# Patient Record
Sex: FEMALE | Race: WHITE | NOT HISPANIC OR LATINO | Employment: FULL TIME | URBAN - METROPOLITAN AREA
[De-identification: names, ages, dates, MRNs, and addresses within clinical notes are randomized per-mention and may not be internally consistent; named-entity substitution may affect disease eponyms.]

---

## 2021-02-24 ENCOUNTER — IMMUNIZATION (OUTPATIENT)
Dept: VACCINE CLINIC | Facility: HOSPITAL | Age: 41
End: 2021-02-24

## 2021-02-24 PROCEDURE — 91300 HC SARSCOV02 VAC 30MCG/0.3ML IM: CPT | Performed by: INTERNAL MEDICINE

## 2021-02-24 PROCEDURE — 0001A: CPT | Performed by: INTERNAL MEDICINE

## 2021-03-17 ENCOUNTER — IMMUNIZATION (OUTPATIENT)
Dept: VACCINE CLINIC | Facility: HOSPITAL | Age: 41
End: 2021-03-17

## 2021-03-17 PROCEDURE — 91300 HC SARSCOV02 VAC 30MCG/0.3ML IM: CPT | Performed by: INTERNAL MEDICINE

## 2021-03-17 PROCEDURE — 0002A: CPT | Performed by: INTERNAL MEDICINE

## 2021-11-12 ENCOUNTER — TRANSCRIBE ORDERS (OUTPATIENT)
Dept: ADMINISTRATIVE | Facility: HOSPITAL | Age: 41
End: 2021-11-12

## 2021-11-12 DIAGNOSIS — Z12.31 VISIT FOR SCREENING MAMMOGRAM: Primary | ICD-10-CM

## 2021-12-29 ENCOUNTER — HOSPITAL ENCOUNTER (OUTPATIENT)
Dept: MAMMOGRAPHY | Facility: HOSPITAL | Age: 41
Discharge: HOME | End: 2021-12-29
Admitting: NURSE PRACTITIONER

## 2021-12-29 DIAGNOSIS — Z12.31 VISIT FOR SCREENING MAMMOGRAM: ICD-10-CM

## 2021-12-29 PROCEDURE — 77063 BREAST TOMOSYNTHESIS BI: CPT | Performed by: RADIOLOGY

## 2021-12-29 PROCEDURE — 77063 BREAST TOMOSYNTHESIS BI: CPT

## 2021-12-29 PROCEDURE — 77067 SCR MAMMO BI INCL CAD: CPT

## 2021-12-29 PROCEDURE — 77067 SCR MAMMO BI INCL CAD: CPT | Performed by: RADIOLOGY

## 2022-02-08 ENCOUNTER — HOSPITAL ENCOUNTER (OUTPATIENT)
Dept: MAMMOGRAPHY | Facility: HOSPITAL | Age: 42
Discharge: HOME | End: 2022-02-08
Admitting: RADIOLOGY

## 2022-02-08 DIAGNOSIS — R92.8 ABNORMAL MAMMOGRAM: ICD-10-CM

## 2022-02-08 PROCEDURE — G0279 TOMOSYNTHESIS, MAMMO: HCPCS

## 2022-02-08 PROCEDURE — 77066 DX MAMMO INCL CAD BI: CPT

## 2022-02-08 PROCEDURE — 77066 DX MAMMO INCL CAD BI: CPT | Performed by: RADIOLOGY

## 2022-02-08 PROCEDURE — 77062 BREAST TOMOSYNTHESIS BI: CPT | Performed by: RADIOLOGY

## 2023-03-07 ENCOUNTER — TRANSCRIBE ORDERS (OUTPATIENT)
Dept: ADMINISTRATIVE | Facility: HOSPITAL | Age: 43
End: 2023-03-07
Payer: COMMERCIAL

## 2023-03-07 DIAGNOSIS — Z12.31 VISIT FOR SCREENING MAMMOGRAM: Primary | ICD-10-CM

## 2023-03-25 ENCOUNTER — HOSPITAL ENCOUNTER (OUTPATIENT)
Dept: MAMMOGRAPHY | Facility: HOSPITAL | Age: 43
Discharge: HOME | End: 2023-03-25
Admitting: NURSE PRACTITIONER
Payer: COMMERCIAL

## 2023-03-25 DIAGNOSIS — Z12.31 VISIT FOR SCREENING MAMMOGRAM: ICD-10-CM

## 2023-03-25 PROCEDURE — 77067 SCR MAMMO BI INCL CAD: CPT | Performed by: RADIOLOGY

## 2023-03-25 PROCEDURE — 77067 SCR MAMMO BI INCL CAD: CPT

## 2023-03-25 PROCEDURE — 77063 BREAST TOMOSYNTHESIS BI: CPT

## 2023-03-25 PROCEDURE — 77063 BREAST TOMOSYNTHESIS BI: CPT | Performed by: RADIOLOGY

## 2023-04-10 ENCOUNTER — OFFICE VISIT (OUTPATIENT)
Dept: NEUROLOGY | Facility: CLINIC | Age: 43
End: 2023-04-10
Payer: COMMERCIAL

## 2023-04-10 VITALS
BODY MASS INDEX: 23.92 KG/M2 | DIASTOLIC BLOOD PRESSURE: 74 MMHG | WEIGHT: 152.4 LBS | OXYGEN SATURATION: 98 % | SYSTOLIC BLOOD PRESSURE: 110 MMHG | HEART RATE: 67 BPM | HEIGHT: 67 IN

## 2023-04-10 DIAGNOSIS — G43.709 CHRONIC MIGRAINE WITHOUT AURA WITHOUT STATUS MIGRAINOSUS, NOT INTRACTABLE: Primary | ICD-10-CM

## 2023-04-10 PROBLEM — E78.5 HYPERLIPIDEMIA: Status: ACTIVE | Noted: 2023-04-10

## 2023-04-10 PROBLEM — E04.9 GOITER: Status: ACTIVE | Noted: 2023-04-10

## 2023-04-10 PROCEDURE — 99204 OFFICE O/P NEW MOD 45 MIN: CPT | Performed by: NURSE PRACTITIONER

## 2023-04-10 RX ORDER — TIRZEPATIDE 5 MG/.5ML
INJECTION, SOLUTION SUBCUTANEOUS
COMMUNITY
Start: 2023-02-27

## 2023-04-10 RX ORDER — AMITRIPTYLINE HYDROCHLORIDE 10 MG/1
10 TABLET, FILM COATED ORAL NIGHTLY
Qty: 30 TABLET | Refills: 5 | Status: SHIPPED | OUTPATIENT
Start: 2023-04-10

## 2023-04-10 RX ORDER — ROSUVASTATIN CALCIUM 5 MG/1
5 TABLET, COATED ORAL NIGHTLY
COMMUNITY
Start: 2023-02-23

## 2023-04-10 RX ORDER — NORGESTIMATE AND ETHINYL ESTRADIOL 7DAYSX3 LO
KIT ORAL
COMMUNITY
Start: 1995-06-01

## 2023-04-10 RX ORDER — LEVOCETIRIZINE DIHYDROCHLORIDE 5 MG/1
1 TABLET, FILM COATED ORAL DAILY
COMMUNITY
Start: 2023-03-23

## 2023-04-10 NOTE — LETTER
2023     NOY Guthrie  [address] [facility] [address]  [facility] [address]    Patient: Екатерина Lomeli   YOB: 1980   Date of Visit: 4/10/2023       Dear NOY Otoole:    Thank you for referring Екатерина Lomeli to me for evaluation. Below are the relevant portions of my assessment and plan of care.    If you have questions, please do not hesitate to call me. I look forward to following Екатерина along with you.         Sincerely,        Merline Granado DNP, NOY        CC: No Recipients    Merline Granado DNP, APRN  04/10/23 1709  Signed     Headache New Office Visit      Encounter Date: 04/10/2023   Patient Name: Екатерина Lomeli  : 1980   MRN: [id number]   PCP: NOY Andrade  Chief Complaint:    Chief Complaint   Patient presents with   • Migraine       History of Present Illness: Екатерина Lomeli is a 42 y.o. female who is here today for evaluation of headaches.      Headache Symptoms:     Days per month: 4-6  Location: Right Eye      Quality: Sharp   Starts mild at 11 am and becomes more intense     Duration: 6-8 hours. Resolved in am most of the time.  Severity: 8-9/10  Triggers: no trigger  Associated Symptoms: Nausea, Photophobia and  Vision changes right eye blurred  Aura: mild pain  Last eye exam: 3-4 years  Sleep: adequate  Hydration: adequate  Stress: managed well    Abortives: Imitrex-has not tried it, Excedrin is effective 75% of the time.  Preventives                 PH  Onset 3/2022    PMH: allergies, goiter TSH -nml  FH:-migraine. + brain tumor in GF  SH: -etoh, -tob, -drug  Subjective      Past Medical History:   Past Medical History:   Diagnosis Date   • Cluster headache 3/22/22    Right eye or between eye brows   • Headache, tension-type 3/22/22   • Hyperlipidemia     Diet change and statin now normal       Past Surgical History:   Past Surgical History:   Procedure Laterality Date   • INTRAOCULAR LENS INSERTION          Family History:   Family History   Problem Relation Age of Onset   • Alzheimer's disease Mother    • Neuropathy Father    • Stroke Father    • Diabetes Father    • Hyperlipidemia Father    • Breast cancer Paternal Grandmother    • Ovarian cancer Paternal Aunt         DX AGE 50'S       Social History:   Social History     Socioeconomic History   • Marital status:    Tobacco Use   • Smoking status: Never   Substance and Sexual Activity   • Alcohol use: Yes     Alcohol/week: 2.0 standard drinks     Types: 2 Glasses of wine per week     Comment: Two drinks a month   • Drug use: Never   • Sexual activity: Yes     Partners: Male     Birth control/protection: Pill, Same-sex partner     Comment:        Medications:     Current Outpatient Medications:   •  norgestimate-ethinyl estradiol (Ortho Tri-Cyclen Lo) 0.18/0.215/0.25 MG-25 MCG per tablet, , Disp: , Rfl:   •  amitriptyline (ELAVIL) 10 MG tablet, Take 1 tablet by mouth Every Night., Disp: 30 tablet, Rfl: 5  •  levocetirizine (XYZAL) 5 MG tablet, Take 1 tablet by mouth Daily., Disp: , Rfl:   •  Mounjaro 5 MG/0.5ML solution pen-injector, INJECT CONTENTS OF ONE PEN SUBCUTANEOUSLY ONCE A WEEK, START AFTER 2.5MG DOSE., Disp: , Rfl:   •  rosuvastatin (CRESTOR) 5 MG tablet, Take 1 tablet by mouth Every Night., Disp: , Rfl:     Allergies:   Allergies   Allergen Reactions   • Sulfamethoxazole-Trimethoprim Hives   • Sulfacetamide Unknown (See Comments)       PHQ-9 Total Score:     STEADI Fall Risk Assessment has not been completed.    Objective     Physical Exam:   Physical Exam  Eyes:      Pupils: Pupils are equal, round, and reactive to light.   Neurological:      Mental Status: She is oriented to person, place, and time.      Coordination: Finger-Nose-Finger Test, Heel to Shin Test and Romberg Test normal.      Gait: Gait is intact.      Deep Tendon Reflexes:      Reflex Scores:       Tricep reflexes are 2+ on the right side and 2+ on the left side.        Bicep reflexes are 2+ on the right side and 2+ on the left side.       Brachioradialis reflexes are 2+ on the right side and 2+ on the left side.       Patellar reflexes are 2+ on the right side and 2+ on the left side.       Achilles reflexes are 2+ on the right side and 2+ on the left side.  Psychiatric:         Speech: Speech normal.         Neurologic Exam     Mental Status   Oriented to person, place, and time.   Follows 3 step commands.   Attention: normal. Concentration: normal.   Speech: speech is normal   Level of consciousness: alert  Knowledge: consistent with education.   Normal comprehension.     Cranial Nerves     CN III, IV, VI   Pupils are equal, round, and reactive to light.  Right pupil: Accommodation: intact.   Left pupil: Accommodation: intact.   CN III: no CN III palsy  CN VI: no CN VI palsy  Nystagmus: none   Diplopia: none  Upgaze: normal  Downgaze: normal  Conjugate gaze: present    CN VII   Facial expression full, symmetric.     CN VIII   Hearing: intact    CN XII   CN XII normal.     Motor Exam   Muscle bulk: normal  Overall muscle tone: normal    Strength   Right biceps: 5/5  Left biceps: 5/5  Right triceps: 5/5  Left triceps: 5/5  Right interossei: 5/5  Left interossei: 5/5  Right quadriceps: 5/5  Left quadriceps: 5/5  Right anterior tibial: 5/5  Left anterior tibial: 5/5  Right posterior tibial: 5/5  Left posterior tibial: 5/5    Sensory Exam   Light touch normal.     Gait, Coordination, and Reflexes     Gait  Gait: normal    Coordination   Romberg: negative  Finger to nose coordination: normal  Heel to shin coordination: normal    Tremor   Resting tremor: absent  Action tremor: absent    Reflexes   Right brachioradialis: 2+  Left brachioradialis: 2+  Right biceps: 2+  Left biceps: 2+  Right triceps: 2+  Left triceps: 2+  Right patellar: 2+  Left patellar: 2+  Right achilles: 2+  Left achilles: 2+  Right : 2+  Left : 2+       Vital Signs:   Vitals:    04/10/23 1449   BP: 110/74  "  Pulse: 67   SpO2: 98%   Weight: 69.1 kg (152 lb 6.4 oz)   Height: 170.2 cm (67\")     Body mass index is 23.87 kg/m².     Assessment / Plan      Assessment/Plan:   Diagnoses and all orders for this visit:    1. Chronic migraine without aura without status migrainosus, not intractable (Primary)  Comments:  Trial of elavil with Excedri and Imtrex.  Orders:  -     MRI Brain With & Without Contrast; Future  -     amitriptyline (ELAVIL) 10 MG tablet; Take 1 tablet by mouth Every Night.  Dispense: 30 tablet; Refill: 5           Patient Education:   I have discussed with the patient today the causes and overview of headaches. We discussed the different types of headaches to include tension-type headaches, Migraine headaches and Cluster headaches. We also discussed when headaches could or would be of a more serious condition such as brain infection, inflammation or bleeding within or around the brain. When to seek immediate medical attention or call 911.     Reviewed medications, potential side effects and signs and symptoms to report. Discussed risk versus benefits of treatment plan with patient and/or family-including medications, labs and radiology that may be ordered. Addressed questions and concerns during visit. Patient and/or family verbalized understanding and agree with plan. Instructed to call the office with any questions and report to ER with any life-threatening symptoms.     Follow Up:   Return in about 3 months (around 7/10/2023) for Recheck.    During this visit the following were done:  Labs Reviewed [x]    Labs Ordered []    Radiology Reports Reviewed []    Radiology Ordered [x]    PCP Records Reviewed [x]    Referring Provider Records Reviewed []    ER Records Reviewed []    Hospital Records Reviewed []    History Obtained From Family []    Radiology Images Reviewed []    Other Reviewed [x]    Records Requested []      Merline Granado, ALLIE, APRN  "

## 2023-04-10 NOTE — PROGRESS NOTES
Headache New Office Visit      Encounter Date: 04/10/2023   Patient Name: Екатерина Lomeli  : 1980   MRN: [id number]   PCP: NYO Andrade  Chief Complaint:    Chief Complaint   Patient presents with   • Migraine       History of Present Illness: Екатерина Lomeli is a 42 y.o. female who is here today for evaluation of headaches.      Headache Symptoms:     Days per month: 4-6  Location: Right Eye      Quality: Sharp   Starts mild at 11 am and becomes more intense     Duration: 6-8 hours. Resolved in am most of the time.  Severity: 8-9/10  Triggers: no trigger  Associated Symptoms: Nausea, Photophobia and  Vision changes right eye blurred  Aura: mild pain  Last eye exam: 3-4 years  Sleep: adequate  Hydration: adequate  Stress: managed well    Abortives: Imitrex-has not tried it, Excedrin is effective 75% of the time.  Preventives                 PH  Onset 3/2022    PMH: allergies, goiter TSH -nml  FH:-migraine. + brain tumor in GF  SH: -etoh, -tob, -drug  Subjective      Past Medical History:   Past Medical History:   Diagnosis Date   • Cluster headache 3/22/22    Right eye or between eye brows   • Headache, tension-type 3/22/22   • Hyperlipidemia     Diet change and statin now normal       Past Surgical History:   Past Surgical History:   Procedure Laterality Date   • INTRAOCULAR LENS INSERTION         Family History:   Family History   Problem Relation Age of Onset   • Alzheimer's disease Mother    • Neuropathy Father    • Stroke Father    • Diabetes Father    • Hyperlipidemia Father    • Breast cancer Paternal Grandmother    • Ovarian cancer Paternal Aunt         DX AGE 50'S       Social History:   Social History     Socioeconomic History   • Marital status:    Tobacco Use   • Smoking status: Never   Substance and Sexual Activity   • Alcohol use: Yes     Alcohol/week: 2.0 standard drinks     Types: 2 Glasses of wine per week     Comment: Two drinks a month   • Drug use: Never    • Sexual activity: Yes     Partners: Male     Birth control/protection: Pill, Same-sex partner     Comment:        Medications:     Current Outpatient Medications:   •  norgestimate-ethinyl estradiol (Ortho Tri-Cyclen Lo) 0.18/0.215/0.25 MG-25 MCG per tablet, , Disp: , Rfl:   •  amitriptyline (ELAVIL) 10 MG tablet, Take 1 tablet by mouth Every Night., Disp: 30 tablet, Rfl: 5  •  levocetirizine (XYZAL) 5 MG tablet, Take 1 tablet by mouth Daily., Disp: , Rfl:   •  Mounjaro 5 MG/0.5ML solution pen-injector, INJECT CONTENTS OF ONE PEN SUBCUTANEOUSLY ONCE A WEEK, START AFTER 2.5MG DOSE., Disp: , Rfl:   •  rosuvastatin (CRESTOR) 5 MG tablet, Take 1 tablet by mouth Every Night., Disp: , Rfl:     Allergies:   Allergies   Allergen Reactions   • Sulfamethoxazole-Trimethoprim Hives   • Sulfacetamide Unknown (See Comments)       PHQ-9 Total Score:     STEADI Fall Risk Assessment has not been completed.    Objective     Physical Exam:   Physical Exam  Eyes:      Pupils: Pupils are equal, round, and reactive to light.   Neurological:      Mental Status: She is oriented to person, place, and time.      Coordination: Finger-Nose-Finger Test, Heel to Shin Test and Romberg Test normal.      Gait: Gait is intact.      Deep Tendon Reflexes:      Reflex Scores:       Tricep reflexes are 2+ on the right side and 2+ on the left side.       Bicep reflexes are 2+ on the right side and 2+ on the left side.       Brachioradialis reflexes are 2+ on the right side and 2+ on the left side.       Patellar reflexes are 2+ on the right side and 2+ on the left side.       Achilles reflexes are 2+ on the right side and 2+ on the left side.  Psychiatric:         Speech: Speech normal.         Neurologic Exam     Mental Status   Oriented to person, place, and time.   Follows 3 step commands.   Attention: normal. Concentration: normal.   Speech: speech is normal   Level of consciousness: alert  Knowledge: consistent with education.   Normal  "comprehension.     Cranial Nerves     CN III, IV, VI   Pupils are equal, round, and reactive to light.  Right pupil: Accommodation: intact.   Left pupil: Accommodation: intact.   CN III: no CN III palsy  CN VI: no CN VI palsy  Nystagmus: none   Diplopia: none  Upgaze: normal  Downgaze: normal  Conjugate gaze: present    CN VII   Facial expression full, symmetric.     CN VIII   Hearing: intact    CN XII   CN XII normal.     Motor Exam   Muscle bulk: normal  Overall muscle tone: normal    Strength   Right biceps: 5/5  Left biceps: 5/5  Right triceps: 5/5  Left triceps: 5/5  Right interossei: 5/5  Left interossei: 5/5  Right quadriceps: 5/5  Left quadriceps: 5/5  Right anterior tibial: 5/5  Left anterior tibial: 5/5  Right posterior tibial: 5/5  Left posterior tibial: 5/5    Sensory Exam   Light touch normal.     Gait, Coordination, and Reflexes     Gait  Gait: normal    Coordination   Romberg: negative  Finger to nose coordination: normal  Heel to shin coordination: normal    Tremor   Resting tremor: absent  Action tremor: absent    Reflexes   Right brachioradialis: 2+  Left brachioradialis: 2+  Right biceps: 2+  Left biceps: 2+  Right triceps: 2+  Left triceps: 2+  Right patellar: 2+  Left patellar: 2+  Right achilles: 2+  Left achilles: 2+  Right : 2+  Left : 2+       Vital Signs:   Vitals:    04/10/23 1449   BP: 110/74   Pulse: 67   SpO2: 98%   Weight: 69.1 kg (152 lb 6.4 oz)   Height: 170.2 cm (67\")     Body mass index is 23.87 kg/m².     Assessment / Plan      Assessment/Plan:   Diagnoses and all orders for this visit:    1. Chronic migraine without aura without status migrainosus, not intractable (Primary)  Comments:  Trial of elavil with Excedri and Imtrex.  Orders:  -     MRI Brain With & Without Contrast; Future  -     amitriptyline (ELAVIL) 10 MG tablet; Take 1 tablet by mouth Every Night.  Dispense: 30 tablet; Refill: 5           Patient Education:   I have discussed with the patient today the " causes and overview of headaches. We discussed the different types of headaches to include tension-type headaches, Migraine headaches and Cluster headaches. We also discussed when headaches could or would be of a more serious condition such as brain infection, inflammation or bleeding within or around the brain. When to seek immediate medical attention or call 911.     Reviewed medications, potential side effects and signs and symptoms to report. Discussed risk versus benefits of treatment plan with patient and/or family-including medications, labs and radiology that may be ordered. Addressed questions and concerns during visit. Patient and/or family verbalized understanding and agree with plan. Instructed to call the office with any questions and report to ER with any life-threatening symptoms.     Follow Up:   Return in about 3 months (around 7/10/2023) for Recheck.    During this visit the following were done:  Labs Reviewed [x]    Labs Ordered []    Radiology Reports Reviewed []    Radiology Ordered [x]    PCP Records Reviewed [x]    Referring Provider Records Reviewed []    ER Records Reviewed []    Hospital Records Reviewed []    History Obtained From Family []    Radiology Images Reviewed []    Other Reviewed [x]    Records Requested []      Merline Granado, DNP, APRN

## 2023-05-23 ENCOUNTER — HOSPITAL ENCOUNTER (OUTPATIENT)
Dept: MRI IMAGING | Facility: HOSPITAL | Age: 43
Discharge: HOME | End: 2023-05-23
Admitting: NURSE PRACTITIONER
Payer: COMMERCIAL

## 2023-05-23 DIAGNOSIS — G43.709 CHRONIC MIGRAINE WITHOUT AURA WITHOUT STATUS MIGRAINOSUS, NOT INTRACTABLE: ICD-10-CM

## 2023-05-23 PROCEDURE — 0 GADOBENATE DIMEGLUMINE 529 MG/ML SOLUTION: Performed by: NURSE PRACTITIONER

## 2023-05-23 PROCEDURE — A9577 INJ MULTIHANCE: HCPCS | Performed by: NURSE PRACTITIONER

## 2023-05-23 PROCEDURE — 70553 MRI BRAIN STEM W/O & W/DYE: CPT

## 2023-05-23 RX ADMIN — GADOBENATE DIMEGLUMINE 14 ML: 529 INJECTION, SOLUTION INTRAVENOUS at 12:50

## 2023-05-24 ENCOUNTER — TELEPHONE (OUTPATIENT)
Dept: NEUROLOGY | Facility: CLINIC | Age: 43
End: 2023-05-24
Payer: COMMERCIAL

## 2023-05-24 NOTE — TELEPHONE ENCOUNTER
Called patient and gave results.  Patient was understanding and appreciative.    ----- Message from Merline Granado DNP, NOY sent at 5/24/2023  2:24 PM EDT -----  Please notify pt MRI of brain is normal.

## 2023-07-25 NOTE — PROGRESS NOTES
Neuro Office Visit      Encounter Date: 2023   Patient Name: Екатерина Lomeli  : 1980   MRN: [id number]   PCP: ZOHAIB Andrade  Chief Complaint:    Chief Complaint   Patient presents with    Migraine       History of Present Illness: Екатерина Lomeli is a 42 y.o. female who is here today in Neurology for  migraine.    Last visit 4/10/2023 w me-elavil w imitrex, mri brain.  MRI Brain With & Without Contrast (2023 12:49)-neg    Had eye exam: it was mostly normal. Optic nerve was normal.    Headaches  Had SE of lethargy with elavil.  Days per month: 4-/month up to 9 headaches a month.  Location: Right Eye      Quality: Sharp   Starts mild at 11 am and becomes more intense     Duration: 6-8 hours. Resolved in am most of the time.  Severity: 8-9/10  Triggers: no trigger  Associated Symptoms: Nausea, Photophobia and  Vision changes right eye blurred  Aura: mild pain  Last eye exam: 3-4 years  Sleep: adequate  Hydration: adequate  Stress: managed well     Abortives: Imitrex-has not tried it, Excedrin is effective 75% of the time.  Preventives: Elavil.                   PH  Onset 3/2022     PMH: allergies, goiter TSH -nml  FH:-migraine. + brain tumor in GF  SH: -etoh, -tob, -drug  S     Subjective      Past Medical History:   Past Medical History:   Diagnosis Date    Cluster headache 3/22/22    Right eye or between eye brows    Headache, tension-type 3/22/22    Hyperlipidemia     Diet change and statin now normal       Past Surgical History:   Past Surgical History:   Procedure Laterality Date    INTRAOCULAR LENS INSERTION         Family History:   Family History   Problem Relation Age of Onset    Alzheimer's disease Mother     Neuropathy Father     Stroke Father     Diabetes Father     Hyperlipidemia Father     Breast cancer Paternal Grandmother     Ovarian cancer Paternal Aunt         DX AGE 50'S       Social History:   Social History     Socioeconomic History    Marital status:     Tobacco Use    Smoking status: Never   Substance and Sexual Activity    Alcohol use: Yes     Alcohol/week: 2.0 standard drinks     Types: 2 Glasses of wine per week     Comment: Two drinks a month    Drug use: Never    Sexual activity: Yes     Partners: Male     Birth control/protection: Pill, Same-sex partner     Comment:        Medications:     Current Outpatient Medications:     levocetirizine (XYZAL) 5 MG tablet, Take 1 tablet by mouth Daily., Disp: , Rfl:     norgestimate-ethinyl estradiol (ORTHO TRI-CYCLEN LO) 0.18/0.215/0.25 MG-25 MCG per tablet, , Disp: , Rfl:     rosuvastatin (CRESTOR) 5 MG tablet, Take 1 tablet by mouth Every Night., Disp: , Rfl:     topiramate (Topamax) 50 MG tablet, Take 1/2 tab bid x 1 week,then 1 tab bid, Disp: 60 tablet, Rfl: 5    Allergies:   Allergies   Allergen Reactions    Sulfamethoxazole-Trimethoprim Hives    Sulfacetamide Unknown (See Comments)       PHQ-9 Total Score:     STEADI Fall Risk Assessment has not been completed.    Objective     Physical Exam:   Physical Exam  Neurological:      Mental Status: She is oriented to person, place, and time.      Gait: Gait is intact.      Deep Tendon Reflexes:      Reflex Scores:       Tricep reflexes are 2+ on the right side and 2+ on the left side.       Bicep reflexes are 2+ on the right side and 2+ on the left side.       Brachioradialis reflexes are 2+ on the right side and 2+ on the left side.       Patellar reflexes are 2+ on the right side and 2+ on the left side.       Achilles reflexes are 2+ on the right side and 2+ on the left side.  Psychiatric:         Speech: Speech normal.       Neurologic Exam     Mental Status   Oriented to person, place, and time.   Follows 3 step commands.   Attention: normal. Concentration: normal.   Speech: speech is normal   Level of consciousness: alert  Knowledge: consistent with education.   Normal comprehension.     Cranial Nerves     CN III, IV, VI   Right pupil:  "Accommodation: intact.   Left pupil: Accommodation: intact.   CN III: no CN III palsy  CN VI: no CN VI palsy  Nystagmus: none   Diplopia: none  Upgaze: normal  Downgaze: normal  Conjugate gaze: present    CN VII   Facial expression full, symmetric.     CN VIII   Hearing: intact    CN XII   CN XII normal.     Motor Exam   Muscle bulk: normal  Overall muscle tone: normal    Strength   Right biceps: 5/5  Left biceps: 5/5  Right triceps: 5/5  Left triceps: 5/5  Right interossei: 5/5  Left interossei: 5/5  Right quadriceps: 5/5  Left quadriceps: 5/5  Right anterior tibial: 5/5  Left anterior tibial: 5/5  Right posterior tibial: 5/5  Left posterior tibial: 5/5    Sensory Exam   Light touch normal.     Gait, Coordination, and Reflexes     Gait  Gait: normal    Tremor   Resting tremor: absent  Action tremor: absent    Reflexes   Right brachioradialis: 2+  Left brachioradialis: 2+  Right biceps: 2+  Left biceps: 2+  Right triceps: 2+  Left triceps: 2+  Right patellar: 2+  Left patellar: 2+  Right achilles: 2+  Left achilles: 2+  Right : 2+  Left : 2+     Vital Signs:   Vitals:    07/26/23 1510   BP: 102/74   Pulse: 60   SpO2: 98%   Weight: 72.7 kg (160 lb 3.2 oz)   Height: 170.2 cm (67.01\")     Body mass index is 25.08 kg/m².     Results:   Imaging:   MRI Brain With & Without Contrast    Result Date: 5/23/2023  Impression: Normal contrast-enhanced MRI of the brain as above. There is no evidence of ischemia, hemorrhage, mass or abnormal enhancement. Electronically Signed: Burke Pérez  5/23/2023 9:35 PM EDT  Workstation ID: [id number]       Labs:    No results found for: CMP, PROTEIN, ANTIMOGAB, DBHCXF2GFNJ, JCVRESULT, QUANTTBGOLD, CBCDIF, IGGALBSER     Assessment / Plan      Assessment/Plan:   Diagnoses and all orders for this visit:    1. Chronic migraine without aura without status migrainosus, not intractable (Primary)  Comments:  Stop elavil. Start TPM. Cont excedrin. If not effective. Start CGRP.  Orders:  -     " topiramate (Topamax) 50 MG tablet; Take 1/2 tab bid x 1 week,then 1 tab bid  Dispense: 60 tablet; Refill: 5           Patient Education:     Reviewed medications, potential side effects and signs and symptoms to report. Discussed risk versus benefits of treatment plan with patient and/or family-including medications, labs and radiology that may be ordered. Addressed questions and concerns during visit. Patient and/or family verbalized understanding and agree with plan. Instructed to call the office with any questions and report to ER with any life-threatening symptoms.     Follow Up:   Return in about 4 months (around 11/26/2023) for Recheck.    During this visit the following were done:  Labs Reviewed []    Labs Ordered []    Radiology Reports Reviewed [x]    Radiology Ordered []    PCP Records Reviewed []    Referring Provider Records Reviewed []    ER Records Reviewed []    Hospital Records Reviewed []    History Obtained From Family []    Radiology Images Reviewed [x]    Other Reviewed []    Records Requested []      Merline Granado, ALLIE, APRN

## 2023-07-26 ENCOUNTER — OFFICE VISIT (OUTPATIENT)
Dept: NEUROLOGY | Facility: CLINIC | Age: 43
End: 2023-07-26
Payer: COMMERCIAL

## 2023-07-26 VITALS
HEART RATE: 60 BPM | SYSTOLIC BLOOD PRESSURE: 102 MMHG | WEIGHT: 160.2 LBS | DIASTOLIC BLOOD PRESSURE: 74 MMHG | OXYGEN SATURATION: 98 % | HEIGHT: 67 IN | BODY MASS INDEX: 25.15 KG/M2

## 2023-07-26 DIAGNOSIS — G43.709 CHRONIC MIGRAINE WITHOUT AURA WITHOUT STATUS MIGRAINOSUS, NOT INTRACTABLE: Primary | ICD-10-CM

## 2023-07-26 PROCEDURE — 99214 OFFICE O/P EST MOD 30 MIN: CPT | Performed by: NURSE PRACTITIONER

## 2023-07-26 RX ORDER — TOPIRAMATE 50 MG/1
TABLET, FILM COATED ORAL
Qty: 60 TABLET | Refills: 5 | Status: SHIPPED | OUTPATIENT
Start: 2023-07-26

## 2023-07-26 NOTE — LETTER
2023     Ananya Haley, NOY  [address]  [place] [address]  [facility] [address]    Patient: Екатерина Lomeli   YOB: 1980   Date of Visit: 2023     Dear NOY Umaña:       Thank you for referring Екатерина Lomeli to me for evaluation. Below are the relevant portions of my assessment and plan of care.    If you have questions, please do not hesitate to call me. I look forward to following Екатерина along with you.         Sincerely,        Merline Granado DNP, NOY        CC: No Recipients    Merline Granado DNP, NOY  23 1632  Signed     Neuro Office Visit      Encounter Date: 2023   Patient Name: Екатерина Lomeli  : 1980   MRN: [id number]   PCP: ZOHAIB Andrade  Chief Complaint:    Chief Complaint   Patient presents with   • Migraine       History of Present Illness: Екатерина Lomeli is a 42 y.o. female who is here today in Neurology for  migraine.    Last visit 4/10/2023 w me-elavil w imitrex, mri brain.  MRI Brain With & Without Contrast (2023 12:49)-neg    Had eye exam: it was mostly normal. Optic nerve was normal.    Headaches  Had SE of lethargy with elavil.  Days per month: 4-/month up to 9 headaches a month.  Location: Right Eye      Quality: Sharp   Starts mild at 11 am and becomes more intense     Duration: 6-8 hours. Resolved in am most of the time.  Severity: 8-9/10  Triggers: no trigger  Associated Symptoms: Nausea, Photophobia and  Vision changes right eye blurred  Aura: mild pain  Last eye exam: 3-4 years  Sleep: adequate  Hydration: adequate  Stress: managed well     Abortives: Imitrex-has not tried it, Excedrin is effective 75% of the time.  Preventives: Elavil.                   PH  Onset 3/2022     PMH: allergies, goiter TSH -nml  FH:-migraine. + brain tumor in GF  SH: -etoh, -tob, -drug  S     Subjective      Past Medical History:   Past Medical History:   Diagnosis Date   • Cluster headache  3/22/22    Right eye or between eye brows   • Headache, tension-type 3/22/22   • Hyperlipidemia 12/23    Diet change and statin now normal       Past Surgical History:   Past Surgical History:   Procedure Laterality Date   • INTRAOCULAR LENS INSERTION         Family History:   Family History   Problem Relation Age of Onset   • Alzheimer's disease Mother    • Neuropathy Father    • Stroke Father    • Diabetes Father    • Hyperlipidemia Father    • Breast cancer Paternal Grandmother    • Ovarian cancer Paternal Aunt         DX AGE 50'S       Social History:   Social History     Socioeconomic History   • Marital status:    Tobacco Use   • Smoking status: Never   Substance and Sexual Activity   • Alcohol use: Yes     Alcohol/week: 2.0 standard drinks     Types: 2 Glasses of wine per week     Comment: Two drinks a month   • Drug use: Never   • Sexual activity: Yes     Partners: Male     Birth control/protection: Pill, Same-sex partner     Comment:        Medications:     Current Outpatient Medications:   •  levocetirizine (XYZAL) 5 MG tablet, Take 1 tablet by mouth Daily., Disp: , Rfl:   •  norgestimate-ethinyl estradiol (ORTHO TRI-CYCLEN LO) 0.18/0.215/0.25 MG-25 MCG per tablet, , Disp: , Rfl:   •  rosuvastatin (CRESTOR) 5 MG tablet, Take 1 tablet by mouth Every Night., Disp: , Rfl:   •  topiramate (Topamax) 50 MG tablet, Take 1/2 tab bid x 1 week,then 1 tab bid, Disp: 60 tablet, Rfl: 5    Allergies:   Allergies   Allergen Reactions   • Sulfamethoxazole-Trimethoprim Hives   • Sulfacetamide Unknown (See Comments)       PHQ-9 Total Score:     STEADI Fall Risk Assessment has not been completed.    Objective     Physical Exam:   Physical Exam  Neurological:      Mental Status: She is oriented to person, place, and time.      Gait: Gait is intact.      Deep Tendon Reflexes:      Reflex Scores:       Tricep reflexes are 2+ on the right side and 2+ on the left side.       Bicep reflexes are 2+ on the right side  "and 2+ on the left side.       Brachioradialis reflexes are 2+ on the right side and 2+ on the left side.       Patellar reflexes are 2+ on the right side and 2+ on the left side.       Achilles reflexes are 2+ on the right side and 2+ on the left side.  Psychiatric:         Speech: Speech normal.       Neurologic Exam     Mental Status   Oriented to person, place, and time.   Follows 3 step commands.   Attention: normal. Concentration: normal.   Speech: speech is normal   Level of consciousness: alert  Knowledge: consistent with education.   Normal comprehension.     Cranial Nerves     CN III, IV, VI   Right pupil: Accommodation: intact.   Left pupil: Accommodation: intact.   CN III: no CN III palsy  CN VI: no CN VI palsy  Nystagmus: none   Diplopia: none  Upgaze: normal  Downgaze: normal  Conjugate gaze: present    CN VII   Facial expression full, symmetric.     CN VIII   Hearing: intact    CN XII   CN XII normal.     Motor Exam   Muscle bulk: normal  Overall muscle tone: normal    Strength   Right biceps: 5/5  Left biceps: 5/5  Right triceps: 5/5  Left triceps: 5/5  Right interossei: 5/5  Left interossei: 5/5  Right quadriceps: 5/5  Left quadriceps: 5/5  Right anterior tibial: 5/5  Left anterior tibial: 5/5  Right posterior tibial: 5/5  Left posterior tibial: 5/5    Sensory Exam   Light touch normal.     Gait, Coordination, and Reflexes     Gait  Gait: normal    Tremor   Resting tremor: absent  Action tremor: absent    Reflexes   Right brachioradialis: 2+  Left brachioradialis: 2+  Right biceps: 2+  Left biceps: 2+  Right triceps: 2+  Left triceps: 2+  Right patellar: 2+  Left patellar: 2+  Right achilles: 2+  Left achilles: 2+  Right : 2+  Left : 2+     Vital Signs:   Vitals:    07/26/23 1510   BP: 102/74   Pulse: 60   SpO2: 98%   Weight: 72.7 kg (160 lb 3.2 oz)   Height: 170.2 cm (67.01\")     Body mass index is 25.08 kg/m².     Results:   Imaging:   MRI Brain With & Without Contrast    Result Date: " 5/23/2023  Impression: Normal contrast-enhanced MRI of the brain as above. There is no evidence of ischemia, hemorrhage, mass or abnormal enhancement. Electronically Signed: Burke Pérez  5/23/2023 9:35 PM EDT  Workstation ID: [id number]       Labs:    No results found for: CMP, PROTEIN, ANTIMOGAB, EJTELI1XQSU, JCVRESULT, QUANTTBGOLD, CBCDIF, IGGALBSER     Assessment / Plan      Assessment/Plan:   Diagnoses and all orders for this visit:    1. Chronic migraine without aura without status migrainosus, not intractable (Primary)  Comments:  Stop elavil. Start TPM. Cont excedrin. If not effective. Start CGRP.  Orders:  -     topiramate (Topamax) 50 MG tablet; Take 1/2 tab bid x 1 week,then 1 tab bid  Dispense: 60 tablet; Refill: 5           Patient Education:     Reviewed medications, potential side effects and signs and symptoms to report. Discussed risk versus benefits of treatment plan with patient and/or family-including medications, labs and radiology that may be ordered. Addressed questions and concerns during visit. Patient and/or family verbalized understanding and agree with plan. Instructed to call the office with any questions and report to ER with any life-threatening symptoms.     Follow Up:   Return in about 4 months (around 11/26/2023) for Recheck.    During this visit the following were done:  Labs Reviewed []    Labs Ordered []    Radiology Reports Reviewed [x]    Radiology Ordered []    PCP Records Reviewed []    Referring Provider Records Reviewed []    ER Records Reviewed []    Hospital Records Reviewed []    History Obtained From Family []    Radiology Images Reviewed [x]    Other Reviewed []    Records Requested []      Merline Granado, DNP, APRN

## 2023-08-15 ENCOUNTER — TELEPHONE (OUTPATIENT)
Dept: GENETICS | Facility: HOSPITAL | Age: 43
End: 2023-08-15
Payer: COMMERCIAL

## 2023-08-15 NOTE — TELEPHONE ENCOUNTER
Called pt regarding fax in genetic referral from Petty Sams's office. Pt is going to think about it and call back if she would like to schedule. Will stand by.

## 2023-10-09 ENCOUNTER — SPECIALTY PHARMACY (OUTPATIENT)
Dept: ONCOLOGY | Facility: HOSPITAL | Age: 43
End: 2023-10-09
Payer: COMMERCIAL

## 2023-10-09 DIAGNOSIS — G43.709 CHRONIC MIGRAINE WITHOUT AURA WITHOUT STATUS MIGRAINOSUS, NOT INTRACTABLE: Primary | ICD-10-CM

## 2023-10-09 RX ORDER — SUMATRIPTAN 50 MG/1
50 TABLET, FILM COATED ORAL
COMMUNITY
End: 2023-10-09 | Stop reason: SDUPTHER

## 2023-10-09 RX ORDER — SUMATRIPTAN 50 MG/1
50 TABLET, FILM COATED ORAL
Qty: 27 TABLET | Refills: 3 | Status: SHIPPED | OUTPATIENT
Start: 2023-10-09

## 2023-10-09 NOTE — PROGRESS NOTES
Specialty Pharmacy Refill Coordination Note     Екатерина is a 43 y.o. female contacted today regarding initial fills of Emgality specialty medication(s).    Reviewed and verified with patient:  Allergies  Meds  Problems       Specialty medication(s) and dose(s) confirmed: yes        Delivery Questions      Flowsheet Row Most Recent Value   Delivery method FedEx   Delivery address correct? Yes   Preferred delivery time? Anytime   Number of medications in delivery 1   Medication(s) being filled and delivered Galcanezumab-Gnlm   Doses left of specialty medications new start   Is there any medication that is due not being filled? No   Supplies needed? No supplies needed   Cooler needed? Yes   Do any medications need mixed or dated? No   Copay form of payment Payment plan already set up   Questions or concerns for the pharmacist? No   Are any medications first time fills? Yes                   Follow-up: 25  day(s)     Anita Zambrano, VasquezD

## 2023-10-09 NOTE — PROGRESS NOTES
Specialty Pharmacy Patient Management Program  Neurology Initial Assessment     Екатерина Lomeli is a 43 y.o. female with migraines seen by a Neurology provider and enrolled in the Neurology Patient Management program offered by [facility].  An initial outreach was conducted, including assessment of therapy appropriateness and specialty medication education for Emgality. The patient was introduced to services offered by [facility], including: regular assessments, refill coordination, curbside pick-up or mail order delivery options, prior authorization maintenance, and financial assistance programs as applicable. The patient was also provided with contact information for the pharmacy team.     Insurance Coverage & Financial Support  Rx [employer], Emgality co-pay card    Relevant Past Medical History and Comorbidities  Relevant medical history and concomitant health conditions were discussed with the patient. The patient's chart has been reviewed for relevant past medical history and comorbid health conditions and updated as necessary.   Past Medical History:   Diagnosis Date    Chronic migraine without aura without status migrainosus, not intractable 10/9/2023    Cluster headache 3/22/22    Right eye or between eye brows    Headache, tension-type 3/22/22    Hyperlipidemia 12/23    Diet change and statin now normal     Social History     Socioeconomic History    Marital status:    Tobacco Use    Smoking status: Never   Substance and Sexual Activity    Alcohol use: Yes     Alcohol/week: 2.0 standard drinks of alcohol     Types: 2 Glasses of wine per week     Comment: Two drinks a month    Drug use: Never    Sexual activity: Yes     Partners: Male     Birth control/protection: Pill, Same-sex partner     Comment:      Problem list reviewed by Anita Zambrano, PharmD on 10/9/2023 at  2:53 PM    Allergies  Known allergies and reactions were discussed  with the patient. The patient's chart has been reviewed for  allergy information and updated as necessary.   Allergies   Allergen Reactions    Sulfamethoxazole-Trimethoprim Hives    Sulfacetamide Unknown (See Comments)     Allergies reviewed by Anita Zambrano, PharmD on 10/9/2023 at  2:53 PM    Relevant Laboratory Values      Lab Assessment  N/A    Current Medication List  This medication list has been reviewed with the patient and evaluated for any interactions or necessary modifications/recommendations, and updated to include all prescription medications, OTC medications, and supplements the patient is currently taking.  This list reflects what is contained in the patient's profile, which has also been marked as reviewed to communicate to other providers it is the most up to date version of the patient's current medication therapy.     Current Outpatient Medications:     galcanezumab-gnlm (EMGALITY) 120 MG/ML auto-injector pen, Inject 2 mL under the skin into the appropriate area as directed 1 (One) Time for 1 dose. Loading dose = 120 mg x 2, Disp: 2 mL, Rfl: 0    galcanezumab-gnlm (EMGALITY) 120 MG/ML auto-injector pen, Inject 1 mL under the skin into the appropriate area as directed Every 28 (Twenty-Eight) Days., Disp: 1 mL, Rfl: 11    levocetirizine (XYZAL) 5 MG tablet, Take 1 tablet by mouth Daily., Disp: , Rfl:     norgestimate-ethinyl estradiol (ORTHO TRI-CYCLEN LO) 0.18/0.215/0.25 MG-25 MCG per tablet, , Disp: , Rfl:     rosuvastatin (CRESTOR) 5 MG tablet, Take 1 tablet by mouth Every Night., Disp: , Rfl:     SUMAtriptan (IMITREX) 50 MG tablet, Take 1 tablet by mouth Every 2 (Two) Hours As Needed for Migraine. Take one tablet at onset of headache. May repeat dose one time in 2 hours if headache not relieved., Disp: , Rfl:     topiramate (Topamax) 50 MG tablet, Take 1/2 tab bid x 1 week,then 1 tab bid, Disp: 60 tablet, Rfl: 5    Medicines reviewed by Anita Zambrano, PharmD on 10/9/2023 at  2:21 PM    Drug  Interactions  None noted     Initial Education Provided for Specialty Medication  The patient has been provided with the following education and any applicable administration techniques (i.e. self-injection) have been demonstrated for the therapies indicated. All questions and concerns have been addressed prior to the patient receiving the medication, and the patient has verbalized understanding of the education and any materials provided.  Additional patient education shall be provided and documented upon request by the patient, provider or payer.              Emgality (Galcanezumab-gnlm)        Medication Expectations   Why am I taking this medication? You are taking this medication for migraine prophylaxis.   What should I expect while on this medication? You should expect to a decrease in the frequency and severity of your migraines.   How does the medication work? Emgality is a monoclonal antibody that binds to calcitonin gene-related peptide (CGRP) and blocks its binding to the receptor decreasing the severity of migraines.   How long will I be on this medication for? The amount of time you will be on this medication will be determined by your doctor and your response to the medication.    How do I take this medication? Take as directed on your prescription label. This medication is a self-injection given every 28 days.    What are some possible side effects? Injection site reactions and hypersensitivity reactions.   What happens if I miss a dose? If you miss a dose, take it as soon as you remember, and time next dose 28 days from last dose.                  Medication Safety   What are things I should warn my doctor immediately about? Hypersensitivity reactions.   What are things that I should be cautious of? Injection site reaction.   What are some medications that can interact with this one? No drug interactions identified.            Medication Storage/Handling   How should I handle this medication? Keep  this medication our of reach of pets/children in original container.  On the day your Emgality is due let it set at room temperature for 30 minutes prior to injection. (do NOT warm using a heat source such as hot water or a microwave).  Administer in the abdomen, thigh, back of the upper arm, or buttocks.  Do not inject where the skin is tender, bruised, red or hard.  Rotate injection sites.   How does this medication need to be stored? Store in refrigerator and keep dry.   How should I dispose of this medication? You can dispose of the empty syringe in a sharps container, and if this is not available you may use an empty hard plastic container such as a milk jug or tide container.            Resources/Support   How can I remind myself to take this medication? You can download a reminder nigel on your phone or use a calandar  to help with your monthly injection.   Is financial support available?  Yes, [employer] can provide co-pay cards if you have commercial insurance or patient assistance if you have Medicare or no insurance.    Which vaccines are recommended for me? Talk to your doctor about these vaccines: Flu, Coronavirus (COVID-19), Pneumococcal (pneumonia), Tdap, Hepatitis B, Zoster (shingles)                 Adherence and Self-Administration  Adherence related to the patient's specialty therapy was discussed with the patient. The Adherence segment of this outreach has been reviewed and updated.   Is there a concern with patient's ability to self administer the medication correctly and without issue?: No  Were any potential barriers to adherence identified during the initial assessment or patient education?: No  Are there any concerns regarding the patient's understanding of the importance of medication adherence?: No  Methods for Supporting Patient Adherence and/or Self-Administration: Emgality self-injections teaching during this video visit.    Goals of Therapy  Goals related to the patient's specialty therapy  were discussed with the patient. The Patient Goals segment of this outreach has been reviewed and updated.   Goals Addressed Today    None          Reassessment Plan & Follow-Up  Medication Therapy Changes: Start Emgality 240mg SubQ x1 followed by Emgality 120 mg SubQ monthly  Related Plans, Therapy Recommendations, or Therapy Problems to Be Addressed: none  Pharmacist to perform regular reassessments no more than (6) months from the previous assessment.  Care Coordinator to set up future refill outreaches, coordinate prescription delivery, and escalate clinical questions to pharmacist.   Welcome information and patient satisfaction survey to be sent by specialty pharmacy team with patient's initial fill.    Attestation  Therapeutic appropriateness: Appropriate   I attest the patient was actively involved in and has agreed to the above plan of care. If the prescribed therapy is at any point deemed not appropriate based on the current or future assessments, a consultation will be initiated with the patient's specialty care provider to determine the best course of action. The revised plan of therapy will be documented along with any additional patient education provided. Discussed aforementioned material with patient via telemedicine.    Anita Zambrano, PharmD, [facility]  Clinic Specialty Pharmacist, Neurology  10/9/2023  14:54 EDT

## 2023-11-06 ENCOUNTER — SPECIALTY PHARMACY (OUTPATIENT)
Dept: ONCOLOGY | Facility: HOSPITAL | Age: 43
End: 2023-11-06
Payer: COMMERCIAL

## 2023-11-06 RX ORDER — GALCANEZUMAB 120 MG/ML
120 INJECTION, SOLUTION SUBCUTANEOUS
Qty: 1 ML | Refills: 0 | Status: SHIPPED | OUTPATIENT
Start: 2023-11-06

## 2023-11-06 NOTE — PROGRESS NOTES
Specialty Pharmacy Refill Coordination Note     Екатерина is a 43 y.o. female contacted today regarding refills of  Emgality specialty medication(s).. patient Injection is due on 11/10    Reviewed and verified with patient:       Specialty medication(s) and dose(s) confirmed: yes    Refill Questions      Flowsheet Row Most Recent Value   Changes to allergies? No   Changes to medications? No   New conditions since last clinic visit No   Unplanned office visit, urgent care, ED, or hospital admission in the last 4 weeks  No   How does patient/caregiver feel medication is working? Very good   Financial problems or insurance changes  No   Since the previous refill, were any specialty medication doses or scheduled injections missed or delayed?  No   Does this patient require a clinical escalation to a pharmacist? No            Delivery Questions      Flowsheet Row Most Recent Value   Delivery method FedEx   Delivery address correct? Yes   Delivery phone number mobile phone   Preferred delivery time? Anytime   Number of medications in delivery 1   Medication(s) being filled and delivered Galcanezumab-Gnlm   Doses left of specialty medications N/A   Is there any medication that is due not being filled? No   Supplies needed? No supplies needed   Cooler needed? Yes   Do any medications need mixed or dated? No   Copay form of payment Payment plan already set up   Additional comments N/A   Questions or concerns for the pharmacist? No   Explain any questions or concerns for the pharmacist N/A   Are any medications first time fills? No   Tracking number for delivery N/A                   Follow-up: 30 day(s)     Dmitry Bettencourt  Specialty Pharmacy Technician

## 2023-11-28 ENCOUNTER — OFFICE VISIT (OUTPATIENT)
Dept: NEUROLOGY | Facility: CLINIC | Age: 43
End: 2023-11-28
Payer: COMMERCIAL

## 2023-11-28 ENCOUNTER — SPECIALTY PHARMACY (OUTPATIENT)
Dept: ONCOLOGY | Facility: HOSPITAL | Age: 43
End: 2023-11-28
Payer: COMMERCIAL

## 2023-11-28 VITALS
HEIGHT: 67 IN | HEART RATE: 74 BPM | SYSTOLIC BLOOD PRESSURE: 114 MMHG | WEIGHT: 165 LBS | DIASTOLIC BLOOD PRESSURE: 78 MMHG | OXYGEN SATURATION: 98 % | BODY MASS INDEX: 25.9 KG/M2

## 2023-11-28 DIAGNOSIS — G43.709 CHRONIC MIGRAINE WITHOUT AURA WITHOUT STATUS MIGRAINOSUS, NOT INTRACTABLE: Primary | ICD-10-CM

## 2023-11-28 PROBLEM — E55.9 VITAMIN D DEFICIENCY: Status: ACTIVE | Noted: 2023-11-28

## 2023-11-28 PROCEDURE — 99213 OFFICE O/P EST LOW 20 MIN: CPT | Performed by: NURSE PRACTITIONER

## 2023-11-28 RX ORDER — CYCLOBENZAPRINE HCL 10 MG
10 TABLET ORAL
COMMUNITY

## 2023-11-28 NOTE — PROGRESS NOTES
Specialty Pharmacy Refill Coordination Note     Екатерина is a 43 y.o. female contacted today regarding refills of  Emgality specialty medication(s).. patient Injection is due on 12/5    Reviewed and verified with patient:       Specialty medication(s) and dose(s) confirmed: yes    Refill Questions      Flowsheet Row Most Recent Value   Changes to allergies? No   Changes to medications? No   New conditions since last clinic visit No   Unplanned office visit, urgent care, ED, or hospital admission in the last 4 weeks  No   How does patient/caregiver feel medication is working? Very good   Financial problems or insurance changes  No   Since the previous refill, were any specialty medication doses or scheduled injections missed or delayed?  No   Does this patient require a clinical escalation to a pharmacist? No            Delivery Questions      Flowsheet Row Most Recent Value   Delivery method FedEx   Delivery address correct? Yes   Delivery phone number mobile phone   Preferred delivery time? Anytime   Number of medications in delivery 1   Medication(s) being filled and delivered Galcanezumab-Gnlm   Doses left of specialty medications N/A   Is there any medication that is due not being filled? No   Supplies needed? No supplies needed   Cooler needed? Yes   Do any medications need mixed or dated? No   Copay form of payment Payment plan already set up   Additional comments N/A   Questions or concerns for the pharmacist? No   Explain any questions or concerns for the pharmacist N/A   Are any medications first time fills? No   Tracking number for delivery N/A                   Follow-up: 30 day(s)     Dmitry Bettencourt  Specialty Pharmacy Technician

## 2023-11-28 NOTE — LETTER
2023     NOY Umaña  [address]   [address]  [facility] [address]    Patient: Екатерина Lomeli   YOB: 1980   Date of Visit: 2023     Dear NOY Umaña:       Thank you for referring Екатерина Lomeli to me for evaluation. Below are the relevant portions of my assessment and plan of care.    If you have questions, please do not hesitate to call me. I look forward to following Екатерина along with you.         Sincerely,        Merline Granado DNP, APRN        CC: No Recipients    Merline Granado DNP, APRN  [phone]  Signed     Neuro Office Visit      Encounter Date: 2023   Patient Name: Екатерина Lomeli  : 1980   MRN: [id number]   PCP: NOY Andrade  Chief Complaint:    Chief Complaint   Patient presents with   • Migraine       History of Present Illness: Екатерина Lomeli is a 43 y.o. female who is here today in Neurology for  migraine.    Last visit 23 w me-stop elavil, start TPM, cont excedrin. Consider cgrp  TPM w SE. Started emgality.    Headaches  Has had 2 injections of emgality. No side effects. In Oct had 7 headaches. In November has had 3 headaches. Less severe and treated with excedrin and triptan with good success. Very happy with her current treatment regimen.      Days per month: 4-/month up to 9 headaches a month.  Location: Right Eye      Quality: Sharp   Starts mild at 11 am and becomes more intense     Duration: 6-8 hours. Resolved in am most of the time.  Severity: 8-9/10  Triggers: no trigger  Associated Symptoms: Nausea, Photophobia and  Vision changes right eye blurred  Aura: mild pain  Last eye exam: UTD  Sleep: adequate  Hydration: adequate  Stress: managed well     Abortives: Imitrex-has not tried it, Excedrin is effective 75% of the time.  Preventives: Elavil, TPM-paresthesia and anxious,emgality.              PH  Onset 3/2022  Had eye exam: it was mostly normal. Optic  nerve was normal.      PMH: allergies, goiter TSH -nml, Vit D def.  FH:-migraine. + brain tumor in GF  SH: -etoh, -tob, -drug  Subjective      Past Medical History:   Past Medical History:   Diagnosis Date   • Chronic migraine without aura without status migrainosus, not intractable 10/9/2023   • Cluster headache 3/22/22    Right eye or between eye brows   • Headache, tension-type 3/22/22   • Hyperlipidemia 12/23    Diet change and statin now normal       Past Surgical History:   Past Surgical History:   Procedure Laterality Date   • INTRAOCULAR LENS INSERTION         Family History:   Family History   Problem Relation Age of Onset   • Alzheimer's disease Mother    • Neuropathy Father    • Stroke Father    • Diabetes Father    • Hyperlipidemia Father    • Breast cancer Paternal Grandmother    • Ovarian cancer Paternal Aunt         DX AGE 50'S       Social History:   Social History     Socioeconomic History   • Marital status:    Tobacco Use   • Smoking status: Never   Substance and Sexual Activity   • Alcohol use: Yes     Alcohol/week: 2.0 standard drinks of alcohol     Types: 2 Glasses of wine per week     Comment: Two drinks a month   • Drug use: Never   • Sexual activity: Yes     Partners: Male     Birth control/protection: Pill, Same-sex partner     Comment:        Medications:     Current Outpatient Medications:   •  Galcanezumab-gnlm (Emgality) 120 MG/ML solution prefilled syringe, Inject 1 mL under the skin into the appropriate area as directed Every 28 (Twenty-Eight) Days., Disp: 1 mL, Rfl: 0  •  levocetirizine (XYZAL) 5 MG tablet, Take 1 tablet by mouth Daily., Disp: , Rfl:   •  rosuvastatin (CRESTOR) 5 MG tablet, Take 1 tablet by mouth Every Night., Disp: , Rfl:   •  SUMAtriptan (IMITREX) 50 MG tablet, Take 1 tablet by mouth Every 2 (Two) Hours As Needed for Migraine. Take one tablet at onset of headache. May repeat dose one time in 2 hours if headache not relieved., Disp: 27 tablet, Rfl:  3  •  cyclobenzaprine (FLEXERIL) 10 MG tablet, Take 1 tablet by mouth. Once daily as needed., Disp: , Rfl:     Allergies:   Allergies   Allergen Reactions   • Sulfamethoxazole-Trimethoprim Hives   • Sulfacetamide Unknown (See Comments)       PHQ-9 Total Score:     STEADI Fall Risk Assessment has not been completed.    Objective     Physical Exam:   Physical Exam  Neurological:      Mental Status: She is oriented to person, place, and time.      Gait: Gait is intact.      Deep Tendon Reflexes:      Reflex Scores:       Tricep reflexes are 2+ on the right side and 2+ on the left side.       Bicep reflexes are 2+ on the right side and 2+ on the left side.       Brachioradialis reflexes are 2+ on the right side and 2+ on the left side.       Patellar reflexes are 2+ on the right side and 2+ on the left side.       Achilles reflexes are 2+ on the right side and 2+ on the left side.  Psychiatric:         Speech: Speech normal.         Neurologic Exam     Mental Status   Oriented to person, place, and time.   Follows 3 step commands.   Attention: normal. Concentration: normal.   Speech: speech is normal   Level of consciousness: alert  Knowledge: consistent with education.   Normal comprehension.     Cranial Nerves     CN III, IV, VI   CN III: no CN III palsy  CN VI: no CN VI palsy  Nystagmus: none   Diplopia: none  Upgaze: normal  Downgaze: normal  Conjugate gaze: present    CN VII   Facial expression full, symmetric.     CN VIII   Hearing: intact    CN XII   CN XII normal.     Motor Exam   Muscle bulk: normal  Overall muscle tone: normal    Strength   Right biceps: 5/5  Left biceps: 5/5  Right triceps: 5/5  Left triceps: 5/5  Right interossei: 5/5  Left interossei: 5/5  Right quadriceps: 5/5  Left quadriceps: 5/5  Right anterior tibial: 5/5  Left anterior tibial: 5/5  Right posterior tibial: 5/5  Left posterior tibial: 5/5    Sensory Exam   Light touch normal.     Gait, Coordination, and Reflexes     Gait  Gait:  "normal    Tremor   Resting tremor: absent  Action tremor: absent    Reflexes   Right brachioradialis: 2+  Left brachioradialis: 2+  Right biceps: 2+  Left biceps: 2+  Right triceps: 2+  Left triceps: 2+  Right patellar: 2+  Left patellar: 2+  Right achilles: 2+  Left achilles: 2+  Right : 2+  Left : 2+       Vital Signs:   Vitals:    11/28/23 1329   BP: 114/78   Pulse: 74   SpO2: 98%   Weight: 74.8 kg (165 lb)   Height: 170.2 cm (67.01\")     Body mass index is 25.84 kg/m².     Results:   Imaging:   No Images in the past 120 days found..       Assessment / Plan      Assessment/Plan:   Diagnoses and all orders for this visit:    1. Chronic migraine without aura without status migrainosus, not intractable (Primary)  Comments:  Cont emgality. Use imitrex prn         Patient Education:     Reviewed medications, potential side effects and signs and symptoms to report. Discussed risk versus benefits of treatment plan with patient and/or family-including medications, labs and radiology that may be ordered. Addressed questions and concerns during visit. Patient and/or family verbalized understanding and agree with plan. Instructed to call the office with any questions and report to ER with any life-threatening symptoms.     Follow Up:   Return in about 1 year (around 11/28/2024) for Recheck.    During this visit the following were done:  Labs Reviewed []    Labs Ordered []    Radiology Reports Reviewed []    Radiology Ordered []    PCP Records Reviewed []    Referring Provider Records Reviewed []    ER Records Reviewed []    Hospital Records Reviewed []    History Obtained From Family []    Radiology Images Reviewed []    Other Reviewed [x]    Records Requested []      Merline Granado, ALLIE, APRN  "

## 2023-11-28 NOTE — PROGRESS NOTES
Neuro Office Visit      Encounter Date: 2023   Patient Name: Екатерина Lomeli  : 1980   MRN: [id number]   PCP: NOY Andrade  Chief Complaint:    Chief Complaint   Patient presents with    Migraine       History of Present Illness: Екатерина Lomeli is a 43 y.o. female who is here today in Neurology for  migraine.    Last visit 23 w me-stop elavil, start TPM, cont excedrin. Consider cgrp  TPM w SE. Started emgality.    Headaches  Has had 2 injections of emgality. No side effects. In Oct had 7 headaches. In November has had 3 headaches. Less severe and treated with excedrin and triptan with good success. Very happy with her current treatment regimen.      Days per month: 4-/month up to 9 headaches a month.  Location: Right Eye      Quality: Sharp   Starts mild at 11 am and becomes more intense     Duration: 6-8 hours. Resolved in am most of the time.  Severity: 8-9/10  Triggers: no trigger  Associated Symptoms: Nausea, Photophobia and  Vision changes right eye blurred  Aura: mild pain  Last eye exam: UTD  Sleep: adequate  Hydration: adequate  Stress: managed well     Abortives: Imitrex-has not tried it, Excedrin is effective 75% of the time.  Preventives: Elavil, TPM-paresthesia and anxious,emgality.              PH  Onset 3/2022  Had eye exam: it was mostly normal. Optic nerve was normal.      PMH: allergies, goiter TSH -nml, Vit D def.  FH:-migraine. + brain tumor in GF  SH: -etoh, -tob, -drug  Subjective      Past Medical History:   Past Medical History:   Diagnosis Date    Chronic migraine without aura without status migrainosus, not intractable 10/9/2023    Cluster headache 3/22/22    Right eye or between eye brows    Headache, tension-type 3/22/22    Hyperlipidemia     Diet change and statin now normal       Past Surgical History:   Past Surgical History:   Procedure Laterality Date    INTRAOCULAR LENS INSERTION         Family History:   Family History   Problem  Relation Age of Onset    Alzheimer's disease Mother     Neuropathy Father     Stroke Father     Diabetes Father     Hyperlipidemia Father     Breast cancer Paternal Grandmother     Ovarian cancer Paternal Aunt         DX AGE 50'S       Social History:   Social History     Socioeconomic History    Marital status:    Tobacco Use    Smoking status: Never   Substance and Sexual Activity    Alcohol use: Yes     Alcohol/week: 2.0 standard drinks of alcohol     Types: 2 Glasses of wine per week     Comment: Two drinks a month    Drug use: Never    Sexual activity: Yes     Partners: Male     Birth control/protection: Pill, Same-sex partner     Comment:        Medications:     Current Outpatient Medications:     Galcanezumab-gnlm (Emgality) 120 MG/ML solution prefilled syringe, Inject 1 mL under the skin into the appropriate area as directed Every 28 (Twenty-Eight) Days., Disp: 1 mL, Rfl: 0    levocetirizine (XYZAL) 5 MG tablet, Take 1 tablet by mouth Daily., Disp: , Rfl:     rosuvastatin (CRESTOR) 5 MG tablet, Take 1 tablet by mouth Every Night., Disp: , Rfl:     SUMAtriptan (IMITREX) 50 MG tablet, Take 1 tablet by mouth Every 2 (Two) Hours As Needed for Migraine. Take one tablet at onset of headache. May repeat dose one time in 2 hours if headache not relieved., Disp: 27 tablet, Rfl: 3    cyclobenzaprine (FLEXERIL) 10 MG tablet, Take 1 tablet by mouth. Once daily as needed., Disp: , Rfl:     Allergies:   Allergies   Allergen Reactions    Sulfamethoxazole-Trimethoprim Hives    Sulfacetamide Unknown (See Comments)       PHQ-9 Total Score:     STEADI Fall Risk Assessment has not been completed.    Objective     Physical Exam:   Physical Exam  Neurological:      Mental Status: She is oriented to person, place, and time.      Gait: Gait is intact.      Deep Tendon Reflexes:      Reflex Scores:       Tricep reflexes are 2+ on the right side and 2+ on the left side.       Bicep reflexes are 2+ on the right side and  "2+ on the left side.       Brachioradialis reflexes are 2+ on the right side and 2+ on the left side.       Patellar reflexes are 2+ on the right side and 2+ on the left side.       Achilles reflexes are 2+ on the right side and 2+ on the left side.  Psychiatric:         Speech: Speech normal.         Neurologic Exam     Mental Status   Oriented to person, place, and time.   Follows 3 step commands.   Attention: normal. Concentration: normal.   Speech: speech is normal   Level of consciousness: alert  Knowledge: consistent with education.   Normal comprehension.     Cranial Nerves     CN III, IV, VI   CN III: no CN III palsy  CN VI: no CN VI palsy  Nystagmus: none   Diplopia: none  Upgaze: normal  Downgaze: normal  Conjugate gaze: present    CN VII   Facial expression full, symmetric.     CN VIII   Hearing: intact    CN XII   CN XII normal.     Motor Exam   Muscle bulk: normal  Overall muscle tone: normal    Strength   Right biceps: 5/5  Left biceps: 5/5  Right triceps: 5/5  Left triceps: 5/5  Right interossei: 5/5  Left interossei: 5/5  Right quadriceps: 5/5  Left quadriceps: 5/5  Right anterior tibial: 5/5  Left anterior tibial: 5/5  Right posterior tibial: 5/5  Left posterior tibial: 5/5    Sensory Exam   Light touch normal.     Gait, Coordination, and Reflexes     Gait  Gait: normal    Tremor   Resting tremor: absent  Action tremor: absent    Reflexes   Right brachioradialis: 2+  Left brachioradialis: 2+  Right biceps: 2+  Left biceps: 2+  Right triceps: 2+  Left triceps: 2+  Right patellar: 2+  Left patellar: 2+  Right achilles: 2+  Left achilles: 2+  Right : 2+  Left : 2+       Vital Signs:   Vitals:    11/28/23 1329   BP: 114/78   Pulse: 74   SpO2: 98%   Weight: 74.8 kg (165 lb)   Height: 170.2 cm (67.01\")     Body mass index is 25.84 kg/m².     Results:   Imaging:   No Images in the past 120 days found..       Assessment / Plan      Assessment/Plan:   Diagnoses and all orders for this visit:    1. " Chronic migraine without aura without status migrainosus, not intractable (Primary)  Comments:  Cont emgality. Use imitrex prn         Patient Education:     Reviewed medications, potential side effects and signs and symptoms to report. Discussed risk versus benefits of treatment plan with patient and/or family-including medications, labs and radiology that may be ordered. Addressed questions and concerns during visit. Patient and/or family verbalized understanding and agree with plan. Instructed to call the office with any questions and report to ER with any life-threatening symptoms.     Follow Up:   Return in about 1 year (around 11/28/2024) for Recheck.    During this visit the following were done:  Labs Reviewed []    Labs Ordered []    Radiology Reports Reviewed []    Radiology Ordered []    PCP Records Reviewed []    Referring Provider Records Reviewed []    ER Records Reviewed []    Hospital Records Reviewed []    History Obtained From Family []    Radiology Images Reviewed []    Other Reviewed [x]    Records Requested []      Merline Granado, DNP, APRN

## 2023-12-20 ENCOUNTER — SPECIALTY PHARMACY (OUTPATIENT)
Dept: ONCOLOGY | Facility: HOSPITAL | Age: 43
End: 2023-12-20
Payer: COMMERCIAL

## 2023-12-20 NOTE — PROGRESS NOTES
Specialty Pharmacy Refill Coordination Note     Екатерина is a 43 y.o. female contacted today regarding refills of Emgality specialty medication(s).. patient Injection is due on 12/24    Reviewed and verified with patient:       Specialty medication(s) and dose(s) confirmed: yes    Refill Questions      Flowsheet Row Most Recent Value   Changes to allergies? No   Changes to medications? No   New conditions or infections since last clinic visit No   Unplanned office visit, urgent care, ED, or hospital admission in the last 4 weeks  No   How does patient/caregiver feel medication is working? Very good   Financial problems or insurance changes  No   Since the previous refill, were any specialty medication doses or scheduled injections missed or delayed?  No   Does this patient require a clinical escalation to a pharmacist? No            Delivery Questions      Flowsheet Row Most Recent Value   Delivery method  at Pharmacy   Delivery address verified with patient/caregiver? Yes   Delivery address Home   Number of medications in delivery 1   Medication(s) being filled and delivered Galcanezumab-[facility]   Doses left of specialty medications N/A   Copay verified? Yes   Copay amount N/A   Copay form of payment No copayment ($0)                   Follow-up: 28 day(s)     Dmitry Bettencourt  Specialty Pharmacy Technician

## 2024-01-18 ENCOUNTER — SPECIALTY PHARMACY (OUTPATIENT)
Dept: ONCOLOGY | Facility: HOSPITAL | Age: 44
End: 2024-01-18
Payer: COMMERCIAL

## 2024-01-18 NOTE — PROGRESS NOTES
Specialty Pharmacy Refill Coordination Note     Екатерина is a 43 y.o. female contacted today regarding refills of Emgality specialty medication(s).. patient Injection is due on 01/25    Reviewed and verified with patient:       Specialty medication(s) and dose(s) confirmed: yes    Refill Questions      Flowsheet Row Most Recent Value   Changes to allergies? No   Changes to medications? No   New conditions or infections since last clinic visit No   Unplanned office visit, urgent care, ED, or hospital admission in the last 4 weeks  No   How does patient/caregiver feel medication is working? Very good   Financial problems or insurance changes  No   Since the previous refill, were any specialty medication doses or scheduled injections missed or delayed?  No   Does this patient require a clinical escalation to a pharmacist? No            Delivery Questions      Flowsheet Row Most Recent Value   Delivery method [employer]   Delivery address verified with patient/caregiver? Yes   Delivery address Home   Number of medications in delivery 1   Medication(s) being filled and delivered Galcanezumab-[facility]   Doses left of specialty medications N/A   Copay verified? Yes   Copay amount N/A   Copay form of payment No copayment ($0)                   Follow-up: 30 day(s)     Dmitry Bettencourt  Specialty Pharmacy Technician

## 2024-02-19 ENCOUNTER — SPECIALTY PHARMACY (OUTPATIENT)
Dept: ONCOLOGY | Facility: HOSPITAL | Age: 44
End: 2024-02-19
Payer: COMMERCIAL

## 2024-02-19 NOTE — PROGRESS NOTES
Specialty Pharmacy Refill Coordination Note     Екатерина is a 43 y.o. female contacted today regarding refills of  Emgality specialty medication(s).. patient Injection is due on 02/25    Reviewed and verified with patient:       Specialty medication(s) and dose(s) confirmed: yes    Refill Questions      Flowsheet Row Most Recent Value   Changes to allergies? No   Changes to medications? No   New conditions or infections since last clinic visit No   Unplanned office visit, urgent care, ED, or hospital admission in the last 4 weeks  No   How does patient/caregiver feel medication is working? Very good   Financial problems or insurance changes  No   Since the previous refill, were any specialty medication doses or scheduled injections missed or delayed?  No   Does this patient require a clinical escalation to a pharmacist? No            Delivery Questions      Flowsheet Row Most Recent Value   Delivery method [employer]   Delivery address verified with patient/caregiver? Yes   Delivery address Home   Number of medications in delivery 1   Medication(s) being filled and delivered Galcanezumab-[facility]   Doses left of specialty medications N/A   Copay verified? Yes   Copay amount N/A   Copay form of payment No copayment ($0)                   Follow-up: 28 day(s)     Dmitry Bettencourt  Specialty Pharmacy Technician

## 2024-02-29 ENCOUNTER — TRANSCRIBE ORDERS (OUTPATIENT)
Dept: ADMINISTRATIVE | Facility: HOSPITAL | Age: 44
End: 2024-02-29
Payer: COMMERCIAL

## 2024-02-29 DIAGNOSIS — Z12.31 VISIT FOR SCREENING MAMMOGRAM: Primary | ICD-10-CM

## 2024-03-14 ENCOUNTER — SPECIALTY PHARMACY (OUTPATIENT)
Dept: PHARMACY | Facility: TELEHEALTH | Age: 44
End: 2024-03-14
Payer: COMMERCIAL

## 2024-03-14 NOTE — PROGRESS NOTES
Specialty Pharmacy Patient Management Program  Call Center Refill Outreach      Екатерина is a 43 y.o. female contacted today regarding refills of her medication(s).    Specialty medication(s) and dose(s) confirmed: Emgality  Other medications being refilled: None    Refill Questions      Flowsheet Row Most Recent Value   Changes to allergies? No   Changes to medications? No   New conditions or infections since last clinic visit No   Unplanned office visit, urgent care, ED, or hospital admission in the last 4 weeks  No   How does patient/caregiver feel medication is working? Very good   Financial problems or insurance changes  No   Since the previous refill, were any specialty medication doses or scheduled injections missed or delayed?  No   Does this patient require a clinical escalation to a pharmacist? No            Delivery Questions      Flowsheet Row Most Recent Value   Delivery method [employer]   Delivery address verified with patient/caregiver? Yes   Delivery address Home   Number of medications in delivery 1   Medication(s) being filled and delivered Galcanezumab-G[facility]   Doses left of specialty medications 0   Copay verified? Yes   Copay amount $0   Copay form of payment No copayment ($0)                   Follow-up: 3 weeks     Eyal Dyer [facility]  Specialty Pharmacist  3/14/2024  11:12 EDT

## 2024-03-14 NOTE — PROGRESS NOTES
"   Specialty Pharmacy Patient Management Program  Reassessment     Екатерина Lomeli is a 43 y.o. female with Chronic Migraines and enrolled in the Patient Management program offered by [facility]. A follow-up outreach was conducted, including assessment of continued therapy appropriateness, medication adherence, and side effect incidence and management for Emgality 120mg/mL.     Changes to Insurance Coverage or Financial Support  none    Relevant Past Medical History and Comorbidities  Relevant medical history and concomitant health conditions were discussed with the patient. The patient's chart has been reviewed for relevant past medical history and comorbid health conditions and updated as necessary.   Past Medical History:   Diagnosis Date    Chronic migraine without aura without status migrainosus, not intractable 10/9/2023    Cluster headache 3/22/22    Right eye or between eye brows    Headache, tension-type 3/22/22    Hyperlipidemia 12/23    Diet change and statin now normal     Social History     Socioeconomic History    Marital status:    Tobacco Use    Smoking status: Never   Substance and Sexual Activity    Alcohol use: Yes     Alcohol/week: 2.0 standard drinks of alcohol     Types: 2 Glasses of wine per week     Comment: Two drinks a month    Drug use: Never    Sexual activity: Yes     Partners: Male     Birth control/protection: Pill, Same-sex partner     Comment:      Problem list reviewed by Eyal Dyer RPH on 3/14/2024 at 11:08 AM    Allergies  Known allergies and reactions were discussed with the patient. The patient's chart has been reviewed for allergy information and updated as necessary.   Allergies   Allergen Reactions    Sulfamethoxazole-Trimethoprim Hives    Sulfacetamide Unknown (See Comments)     Allergies reviewed by Eyal Dyer RPH on 3/14/2024 at 11:08 AM    Relevant Laboratory Values  No results found for: \"GLUCOSE\", \"CALCIUM\", \"NA\", \"K\", " "\"CO2\", \"CL\", \"BUN\", \"CREATININE\", \"EGFRRESULT\", \"BCR\", \"ANIONGAP\"  No results found for: \"WBC\", \"HGB\", \"HCT\", \"MCV\", \"PLT\", \"INR\"  Lab Value Review  The above lab values have been reviewed; the following specialty medication(s) dose adjustment(s) are recommended: none.    Current Medication List  This medication list has been reviewed with the patient and evaluated for any interactions or necessary modifications/recommendations, and updated to include all prescription medications, OTC medications, and supplements the patient is currently taking. This list reflects what is contained in the patient's profile, which has also been marked as reviewed to communicate to other providers it is the most up to date version of the patient's current medication therapy.     Current Outpatient Medications:     cyclobenzaprine (FLEXERIL) 10 MG tablet, Take 1 tablet by mouth. Once daily as needed., Disp: , Rfl:     galcanezumab-gnlm (EMGALITY) 120 MG/ML auto-injector pen, Inject 1 mL under the skin into the appropriate area as directed Every 28 (Twenty-Eight) Days., Disp: 1 mL, Rfl: 11    levocetirizine (XYZAL) 5 MG tablet, Take 1 tablet by mouth Daily., Disp: , Rfl:     rosuvastatin (CRESTOR) 5 MG tablet, Take 1 tablet by mouth Every Night., Disp: , Rfl:     SUMAtriptan (IMITREX) 50 MG tablet, Take 1 tablet by mouth Every 2 (Two) Hours As Needed for Migraine. Take one tablet at onset of headache. May repeat dose one time in 2 hours if headache not relieved., Disp: 27 tablet, Rfl: 3  Medicines reviewed by Eyal Dyer RPH on 3/14/2024 at 11:08 AM    Drug Interactions  none     Adverse Drug Reactions  Medication tolerability: Tolerating with no to minimal ADRs  Medication plan: Continue therapy with normal follow-up  Plan for ADR Management: None    Hospitalizations and Urgent Care Since Last Assessment  Hospitalizations or Admissions: none  ED Visits: none  Urgent Office Visits: none     Adherence, Self-Administration, and Current " Therapy Problems  Adherence related to the patient's specialty therapy was discussed with the patient. The Adherence segment of this outreach has been reviewed and updated.     Adherence Questions  Linked Medication(s) Assessed: Galcanezumab-Gnlm  On average, how many doses/injections does the patient miss per month?: 0  What are the identified reasons for non-adherence or missed doses? : no problems identified  What is the estimated medication adherence level?: 80-89%  Based on the patient/caregiver response and refill history, does this patient require an MTP to track adherence improvements?: no    Additional Barriers to Patient Self-Administration: None  Methods for Supporting Patient Self-Administration: None    Open Medication Therapy Problems  No medication therapy recommendations to display    Goals of Therapy  Goals related to the patient's specialty therapy were discussed with the patient. The Patient Goals segment of this outreach has been reviewed and updated.   Goals Addressed Today        Specialty Pharmacy General Goal      Decrease frequency, severity and duration of migraine attacks.                Progress Toward Meeting Patient-Identified Goals of Therapy: On Track  New Patient-Identified Goals, If Applicable:     Progress Toward Meeting Clinical Goals or Therapeutic Targets: On Track  New Clinical Goals or Therapeutic Targets, If Applicable:     Quality of Life Assessment   Quality of Life related to the patient's enrollment in the patient management program and services provided was discussed with the patient. The QOL segment of this outreach has been reviewed and updated.  Quality of Life Improvement Scale: 7-Somewhat better    Reassessment Plan & Follow-Up  Medication Therapy Changes: none  Additional Plans, Therapy Recommendations, or Therapy Problems to Be Addressed: none   Pharmacist to perform regular reassessments no more than (6) months from the previous assessment.  Care Coordinator to  set up future refill outreaches, coordinate prescription delivery, and escalate clinical questions to pharmacist.     Attestation  I attest the patient was actively involved in and has agreed to the above plan of care. I attest that the specialty medication(s) addressed above are appropriate for the patient based on my reassessment. If the prescribed therapy is at any point deemed not appropriate based on the current or future assessments, a consultation will be initiated with the patient's specialty care provider to determine the best course of action. The revised plan of therapy will be documented along with any required assessments and/or additional patient education provided.     Electronically signed by Eyal Dyer RPH, 03/14/24, 11:12 AM EDT.

## 2024-04-05 ENCOUNTER — HOSPITAL ENCOUNTER (OUTPATIENT)
Dept: MAMMOGRAPHY | Facility: HOSPITAL | Age: 44
Discharge: HOME | End: 2024-04-05
Admitting: NURSE PRACTITIONER
Payer: COMMERCIAL

## 2024-04-05 DIAGNOSIS — Z12.31 VISIT FOR SCREENING MAMMOGRAM: ICD-10-CM

## 2024-04-05 PROCEDURE — 77067 SCR MAMMO BI INCL CAD: CPT

## 2024-04-05 PROCEDURE — 77063 BREAST TOMOSYNTHESIS BI: CPT

## 2024-04-16 ENCOUNTER — SPECIALTY PHARMACY (OUTPATIENT)
Dept: ONCOLOGY | Facility: HOSPITAL | Age: 44
End: 2024-04-16
Payer: COMMERCIAL

## 2024-04-16 NOTE — PROGRESS NOTES
Specialty Pharmacy Refill Coordination Note     Екатерина is a 43 y.o. female contacted today regarding refills of Emgality  specialty medication(s).Patient is due for injection on 04/20.    Reviewed and verified with patient:       Specialty medication(s) and dose(s) confirmed: yes    Refill Questions      Flowsheet Row Most Recent Value   Changes to allergies? No   Changes to medications? No   New conditions or infections since last clinic visit No   Unplanned office visit, urgent care, ED, or hospital admission in the last 4 weeks  No   How does patient/caregiver feel medication is working? Good   Financial problems or insurance changes  No   Since the previous refill, were any specialty medication doses or scheduled injections missed or delayed?  No   Does this patient require a clinical escalation to a pharmacist? No            Delivery Questions      Flowsheet Row Most Recent Value   Delivery method [employer]   Delivery address verified with patient/caregiver? Yes   Delivery address Home   Number of medications in delivery 1   Medication(s) being filled and delivered Galcanezumab-[facility]   Doses left of specialty medications N/A   Copay verified? Yes   Copay amount N/A   Copay form of payment No copayment ($0)                   Follow-up: 28 day(s)     Dmitry Bettencourt  Specialty Pharmacy Technician

## 2024-05-09 ENCOUNTER — SPECIALTY PHARMACY (OUTPATIENT)
Dept: ONCOLOGY | Facility: HOSPITAL | Age: 44
End: 2024-05-09
Payer: COMMERCIAL

## 2024-06-06 ENCOUNTER — SPECIALTY PHARMACY (OUTPATIENT)
Dept: ONCOLOGY | Facility: HOSPITAL | Age: 44
End: 2024-06-06
Payer: COMMERCIAL

## 2024-06-06 NOTE — PROGRESS NOTES
Specialty Pharmacy Refill Coordination Note     Екатерина is a 43 y.o. female contacted today regarding refills of Emgality specialty medication(s)..Patient is due for injection on 06/08.    Reviewed and verified with patient:       Specialty medication(s) and dose(s) confirmed: yes    Refill Questions      Flowsheet Row Most Recent Value   Changes to allergies? No   Changes to medications? No   New conditions or infections since last clinic visit No   Unplanned office visit, urgent care, ED, or hospital admission in the last 4 weeks  No   How does patient/caregiver feel medication is working? Good   Financial problems or insurance changes  No   Since the previous refill, were any specialty medication doses or scheduled injections missed or delayed?  No   Does this patient require a clinical escalation to a pharmacist? No            Delivery Questions      Flowsheet Row Most Recent Value   Delivery method [employer]   Delivery address verified with patient/caregiver? Yes   Delivery address Home   Number of medications in delivery 1   Medication(s) being filled and delivered Galcanezumab-[facility]   Doses left of specialty medications N/A   Copay verified? Yes   Copay amount N/A   Copay form of payment No copayment ($0)                   Follow-up: 28 day(s)     Dmitry Bettencourt  Specialty Pharmacy Technician

## 2024-07-01 ENCOUNTER — SPECIALTY PHARMACY (OUTPATIENT)
Dept: ONCOLOGY | Facility: HOSPITAL | Age: 44
End: 2024-07-01
Payer: COMMERCIAL

## 2024-07-01 NOTE — PROGRESS NOTES
Specialty Pharmacy Refill Coordination Note     Екатерина is a 43 y.o. female contacted today regarding refills of Emgality specialty medication(s).Patient is due for injection on 07/08.    Reviewed and verified with patient:       Specialty medication(s) and dose(s) confirmed: yes    Refill Questions      Flowsheet Row Most Recent Value   Changes to allergies? No   Changes to medications? Yes  [07/01 patient stating  has prescribed Prednisone almost 2 weeks ago and she finished taking it.]   New conditions or infections since last clinic visit No   Unplanned office visit, urgent care, ED, or hospital admission in the last 4 weeks  Yes  [07/01 patient stating DrOrquidea has  prescribed Prednisone almost 2 weeks ago and she finished taking it.]   How does patient/caregiver feel medication is working? Good   Financial problems or insurance changes  No   Since the previous refill, were any specialty medication doses or scheduled injections missed or delayed?  No   Does this patient require a clinical escalation to a pharmacist? Yes  [07/01 patient stating  has prescribed Prednisone almost 2 weeks ago and she finished taking it.]            Delivery Questions      Flowsheet Row Most Recent Value   Delivery method [employer]Ex   Delivery address verified with patient/caregiver? Yes   Delivery address Home   Number of medications in delivery 1   Medication(s) being filled and delivered Galcanezumab-[facility]   Doses left of specialty medications N/A   Copay verified? Yes   Copay amount N/A   Copay form of payment No copayment ($0)                   Follow-up: 28 day(s)     Dmitry Bettencourt  Specialty Pharmacy Technician

## 2024-07-26 ENCOUNTER — SPECIALTY PHARMACY (OUTPATIENT)
Dept: ONCOLOGY | Facility: HOSPITAL | Age: 44
End: 2024-07-26
Payer: COMMERCIAL

## 2024-07-26 NOTE — PROGRESS NOTES
Specialty Pharmacy Refill Coordination Note     Екатерина is a 43 y.o. female contacted today regarding refills of Emgality specialty medication(s).Patient is due for injection on 08/08.    Reviewed and verified with patient:       Specialty medication(s) and dose(s) confirmed: yes    Refill Questions      Flowsheet Row Most Recent Value   Changes to allergies? No   Changes to medications? No   New conditions or infections since last clinic visit No   Unplanned office visit, urgent care, ED, or hospital admission in the last 4 weeks  No   How does patient/caregiver feel medication is working? Good   Financial problems or insurance changes  No   Since the previous refill, were any specialty medication doses or scheduled injections missed or delayed?  No   Does this patient require a clinical escalation to a pharmacist? No            Delivery Questions      Flowsheet Row Most Recent Value   Delivery method [employer]   Delivery address verified with patient/caregiver? Yes   Delivery address Home   Number of medications in delivery 1   Medication(s) being filled and delivered Galcanezumab-[facility]   Doses left of specialty medications N/A   Copay verified? Yes   Copay amount N/A   Copay form of payment No copayment ($0)   Ship Date 07/29   Delivery Date 07/30   Signature Required No                   Follow-up: 28 day(s)     Dmitry Bettencourt  Specialty Pharmacy Technician

## 2024-08-19 ENCOUNTER — SPECIALTY PHARMACY (OUTPATIENT)
Dept: ONCOLOGY | Facility: HOSPITAL | Age: 44
End: 2024-08-19
Payer: COMMERCIAL

## 2024-08-19 NOTE — PROGRESS NOTES
Specialty Pharmacy Patient Management Program  Neurology Reassessment     Екатерина Lomeli is a 44 y.o. female with migraines seen by a Neurology provider and enrolled in the Neurology Patient Management program offered by [facility].  A follow-up outreach was conducted, including assessment of continued therapy appropriateness, medication adherence, and side effect incidence and management for emgality 120 mg/mL.     Changes to Insurance Coverage or Financial Support  No changes.     Relevant Past Medical History and Comorbidities  Relevant medical history and concomitant health conditions were discussed with the patient. The patient's chart has been reviewed for relevant past medical history and comorbid health conditions and updated as necessary.   Past Medical History:   Diagnosis Date    Chronic migraine without aura without status migrainosus, not intractable 10/9/2023    Cluster headache 3/22/22    Right eye or between eye brows    Headache, tension-type 3/22/22    Hyperlipidemia 12/23    Diet change and statin now normal     Social History     Socioeconomic History    Marital status:    Tobacco Use    Smoking status: Never   Substance and Sexual Activity    Alcohol use: Yes     Alcohol/week: 2.0 standard drinks of alcohol     Types: 2 Glasses of wine per week     Comment: Two drinks a month    Drug use: Never    Sexual activity: Yes     Partners: Male     Birth control/protection: Pill, Partner of same sex     Comment:      Problem list reviewed by Issa Sheikh, PharmD on 8/19/2024 at  3:01 PM    Hospitalizations and Urgent Care Since Last Assessment  ED Visits, Admissions, or Hospitalizations: none   Urgent Office Visits: noen     Allergies  Known allergies and reactions were discussed with the patient. The patient's chart has been reviewed for allergy information and updated as necessary.   Allergies   Allergen Reactions    Sulfamethoxazole-Trimethoprim  Hives    Sulfacetamide Unknown (See Comments)     Allergies reviewed by Issa Sheikh, PharmD on 8/19/2024 at  3:02 PM    Relevant Laboratory Values      Lab Assessment     Current Medication List  This medication list has been reviewed with the patient and evaluated for any interactions or necessary modifications/recommendations, and updated to include all prescription medications, OTC medications, and supplements the patient is currently taking.  This list reflects what is contained in the patient's profile, which has also been marked as reviewed to communicate to other providers it is the most up to date version of the patient's current medication therapy.     Current Outpatient Medications:     cyclobenzaprine (FLEXERIL) 10 MG tablet, Take 1 tablet by mouth. Once daily as needed., Disp: , Rfl:     galcanezumab-gnlm (EMGALITY) 120 MG/ML auto-injector pen, Inject 1 mL under the skin into the appropriate area as directed Every 28 (Twenty-Eight) Days., Disp: 1 mL, Rfl: 11    levocetirizine (XYZAL) 5 MG tablet, Take 1 tablet by mouth Daily., Disp: , Rfl:     rosuvastatin (CRESTOR) 5 MG tablet, Take 1 tablet by mouth Every Night., Disp: , Rfl:     SUMAtriptan (IMITREX) 50 MG tablet, Take 1 tablet by mouth Every 2 (Two) Hours As Needed for Migraine. Take one tablet at onset of headache. May repeat dose one time in 2 hours if headache not relieved., Disp: 27 tablet, Rfl: 3    aspirin-acetaminophen-caffeine (Excedrin Migraine) 250-250-65 MG per tablet, Take 1 tablet by mouth Every 6 (Six) Hours As Needed., Disp: , Rfl:     Cholecalciferol 25 MCG (1000 UT) capsule, Take 1 capsule by mouth Daily., Disp: , Rfl:     multivitamin with minerals (MULTIVITAMIN ADULT PO), Take 1 tablet by mouth Daily., Disp: , Rfl:     Medicines reviewed by Issa Sheikh, PharmD on 8/19/2024 at  3:04 PM    Drug Interactions  No relevant drug drug interactions with specialty medication.       Adverse Drug Reactions  Medication  tolerability: Tolerating with no to minimal ADRs          Medication plan: Continue therapy with normal follow-up  Plan for ADR Management: patient to report      Adherence, Self-Administration, and Current Therapy Problems  Adherence related patient's specialty therapy was discussed with the patient. The Adherence segment of this outreach has been reviewed and updated.   Adherence Questions  Linked Medication(s) Assessed: Galcanezumab-Gnlm  On average, how many doses/injections does the patient miss per month?: 0  What are the identified reasons for non-adherence or missed doses? : no problems identified  Based on the patient/caregiver response and refill history, does this patient require an MTP to track adherence improvements?: no    Additional Barriers to Patient Self-Administration: none  Methods for Supporting Patient Self-Administration: Patient is adept with emgality self-injections.    Recently Close Medication Therapy Problems  No medication therapy recommendations to display  Open Medication Therapy Problems  No medication therapy recommendations to display     Goals of Therapy  Goals related to the patient's specialty therapy was discussed with the patient. The Patient Goals segment of this outreach has been reviewed and updated.    Goals Addressed Today        Specialty Pharmacy General Goal      Decrease frequency, severity and duration of migraine attacks.                 Quality of Life Assessment   Quality of Life related to the patient's enrollment in the patient management program and services provided was discussed with the patient. The QOL segment of this outreach has been reviewed and updated.   Quality of Life Improvement Scale: 7-Somewhat better    Reassessment Plan & Follow-Up  Medication Therapy Changes: continue emgality 120 mg/mL  Related Plans, Therapy Recommendations, or Issues to Be Addressed: n/a  Pharmacist to perform regular reassessments no more than (6) months from the previous  assessment.  Care Coordinator to set up future refill outreaches, coordinate prescription delivery, and escalate clinical questions to pharmacist.     Attestation  Therapeutic appropriateness: Appropriate  I attest the patient was actively involved in and has agreed to the above plan of care. If the prescribed therapy is at any point deemed not appropriate based on the current or future assessments, a consultation will be initiated with the patient's specialty care provider to determine the best course of action. The revised plan of therapy will be documented along with any additional patient education provided. Discussed aforementioned material with patient via telemedicine.    Issa Sheikh, PharmD, [facility]  Clinic Specialty Pharmacist, Neurology  8/19/2024  15:08 EDT

## 2024-08-19 NOTE — PROGRESS NOTES
Specialty Pharmacy Refill Coordination Note     Екатерина is a 44 y.o. female contacted today regarding refills of her specialty medication(s).    Specialty medication(s) and dose(s) confirmed: EMGALITY 120 mg/mL  Changes to medications: no  Changes to insurance: no  Reviewed and verified with patient:  Allergies  Meds  Problems         Refill Questions      Flowsheet Row Most Recent Value   Changes to allergies? No   Changes to medications? No   New conditions or infections since last clinic visit No   Unplanned office visit, urgent care, ED, or hospital admission in the last 4 weeks  No   How does patient/caregiver feel medication is working? Very good   Financial problems or insurance changes  No   Since the previous refill, were any specialty medication doses or scheduled injections missed or delayed?  No   Does this patient require a clinical escalation to a pharmacist? No            Delivery Questions      Flowsheet Row Most Recent Value   Delivery method --  [ups]   Delivery address verified with patient/caregiver? No   Delivery address Home   Number of medications in delivery 1   Medication(s) being filled and delivered Galcanezumab-[facility]   Copay verified? Yes   Copay form of payment No copayment ($0)   Signature Required No                 Follow-up: 3.5 week(s)     Issa Sheikh, PharmD  8/19/2024   15:07 EDT

## 2024-08-29 ENCOUNTER — TRANSCRIBE ORDERS (OUTPATIENT)
Dept: ADMINISTRATIVE | Facility: HOSPITAL | Age: 44
End: 2024-08-29
Payer: COMMERCIAL

## 2024-08-29 DIAGNOSIS — Z12.31 VISIT FOR SCREENING MAMMOGRAM: Primary | ICD-10-CM

## 2024-09-12 ENCOUNTER — SPECIALTY PHARMACY (OUTPATIENT)
Dept: ONCOLOGY | Facility: HOSPITAL | Age: 44
End: 2024-09-12
Payer: COMMERCIAL

## 2024-09-12 NOTE — PROGRESS NOTES
Specialty Pharmacy Refill Coordination Note     Екатерина is a 44 y.o. female contacted today regarding refills of Emgality specialty medication(s).Patient is due for injection on 10/6.    Reviewed and verified with patient:       Specialty medication(s) and dose(s) confirmed: yes    Refill Questions      Flowsheet Row Most Recent Value   Changes to allergies? No   Changes to medications? No   New conditions or infections since last clinic visit No   Unplanned office visit, urgent care, ED, or hospital admission in the last 4 weeks  No   How does patient/caregiver feel medication is working? Good   Financial problems or insurance changes  No   Since the previous refill, were any specialty medication doses or scheduled injections missed or delayed?  No   Does this patient require a clinical escalation to a pharmacist? No            Delivery Questions      Flowsheet Row Most Recent Value   Delivery method [employer]   Delivery address verified with patient/caregiver? Yes   Delivery address Home   Number of medications in delivery 1   Medication(s) being filled and delivered Galcanezumab-[facility]   Doses left of specialty medications N/A   Copay verified? Yes   Copay amount Emgality =$0   Copay form of payment No copayment ($0)   Ship Date 09/16   Delivery Date 09/17   Signature Required No                   Follow-up: 28 day(s)     Dmitry Bettencourt  Specialty Pharmacy Technician

## 2024-10-21 ENCOUNTER — SPECIALTY PHARMACY (OUTPATIENT)
Dept: ONCOLOGY | Facility: HOSPITAL | Age: 44
End: 2024-10-21
Payer: COMMERCIAL

## 2024-10-21 NOTE — PROGRESS NOTES
Specialty Pharmacy Refill Coordination Note     Екатерина is a 44 y.o. female contacted today regarding refills of Emgality specialty medication(s).Patient is due for injection on 11/02.    Reviewed and verified with patient:       Specialty medication(s) and dose(s) confirmed: yes    Refill Questions      Flowsheet Row Most Recent Value   Changes to allergies? No   Changes to medications? No   New conditions or infections since last clinic visit No   Unplanned office visit, urgent care, ED, or hospital admission in the last 4 weeks  No   How does patient/caregiver feel medication is working? Good   Financial problems or insurance changes  No   Since the previous refill, were any specialty medication doses or scheduled injections missed or delayed?  No   Does this patient require a clinical escalation to a pharmacist? No            Delivery Questions      Flowsheet Row Most Recent Value   Delivery method [employer]   Delivery address verified with patient/caregiver? Yes   Delivery address Home   Number of medications in delivery 1   Medication(s) being filled and delivered Galcanezumab-gnlm (EMGALITY)   Doses left of specialty medications N/A   Copay verified? Yes   Copay amount Emgality =$0   Copay form of payment No copayment ($0)   Ship Date 10/21   Delivery Date 10/22   Signature Required No                   Follow-up: 28 day(s)     Dmitry Bettencourt  Specialty Pharmacy Technician

## 2024-11-13 ENCOUNTER — SPECIALTY PHARMACY (OUTPATIENT)
Dept: ONCOLOGY | Facility: HOSPITAL | Age: 44
End: 2024-11-13
Payer: COMMERCIAL

## 2024-11-13 NOTE — PROGRESS NOTES
Specialty Pharmacy Refill Coordination Note     Екатерина is a 44 y.o. female contacted today regarding refills of Emgality specialty medication(s).Patient is due for injection on 12/2.    Reviewed and verified with patient:       Specialty medication(s) and dose(s) confirmed: yes    Refill Questions      Flowsheet Row Most Recent Value   Changes to allergies? No   Changes to medications? No   New conditions or infections since last clinic visit No   Unplanned office visit, urgent care, ED, or hospital admission in the last 4 weeks  No   How does patient/caregiver feel medication is working? Good   Financial problems or insurance changes  No   Since the previous refill, were any specialty medication doses or scheduled injections missed or delayed?  No   Does this patient require a clinical escalation to a pharmacist? No            Delivery Questions      Flowsheet Row Most Recent Value   Delivery method UPS   Delivery address verified with patient/caregiver? Yes   Delivery address Home   Number of medications in delivery 1   Medication(s) being filled and delivered Galcanezumab-gnlm (EMGALITY)   Doses left of specialty medications N/A   Copay verified? Yes   Copay amount Emgality =$0   Copay form of payment No copayment ($0)   Ship Date 11/14   Delivery Date 11/15   Signature Required No                   Follow-up: 28 day(s)     Dmitry Bettencourt  Specialty Pharmacy Technician

## 2024-12-02 ENCOUNTER — OFFICE VISIT (OUTPATIENT)
Dept: NEUROLOGY | Facility: CLINIC | Age: 44
End: 2024-12-02
Payer: COMMERCIAL

## 2024-12-02 VITALS
HEART RATE: 62 BPM | OXYGEN SATURATION: 98 % | DIASTOLIC BLOOD PRESSURE: 68 MMHG | HEIGHT: 66 IN | BODY MASS INDEX: 27.51 KG/M2 | SYSTOLIC BLOOD PRESSURE: 104 MMHG | WEIGHT: 171.2 LBS

## 2024-12-02 DIAGNOSIS — G43.709 CHRONIC MIGRAINE WITHOUT AURA WITHOUT STATUS MIGRAINOSUS, NOT INTRACTABLE: ICD-10-CM

## 2024-12-02 PROCEDURE — 99214 OFFICE O/P EST MOD 30 MIN: CPT | Performed by: NURSE PRACTITIONER

## 2024-12-02 RX ORDER — SUMATRIPTAN 50 MG/1
50 TABLET, FILM COATED ORAL
Qty: 27 TABLET | Refills: 3 | Status: SHIPPED | OUTPATIENT
Start: 2024-12-02

## 2024-12-02 NOTE — LETTER
December 3, 2024     Geneva Fleming MD  [address] [facility] [address]    Patient: Екатерина Lomeli   YOB: 1980   Date of Visit: 2024     Dear Geneva Fleming MD:       Thank you for referring Екатерина Lomeli to me for evaluation. Below are the relevant portions of my assessment and plan of care.    If you have questions, please do not hesitate to call me. I look forward to following Екатерина along with you.         Sincerely,        Merline Granado DNP, APRN        CC: No Recipients    Merline Granado DNP, APRN  24 1624  Signed     Neuro Office Visit      Encounter Date: 2024   Patient Name: Екатерина Lomeli  : 1980   MRN: [id number]   PCP: Dr Fleming  Chief Complaint:    Chief Complaint   Patient presents with   • Migraine       History of Present Illness: Екатерина Lomeli is a 44 y.o. female who is here today in Neurology for  migraine    Last visit 2023 w me-cont emgality, use imitrex prn  History of Present Illness  The patient presents for evaluation of headaches.    She reports experiencing an average of 5 to 6 headache episodes per month. However, she had a particularly severe episode in 2024, which coincided with a period of travel and vaccination. Despite exploring dietary changes to reduce inflammation and using Liquid IVs for hydration, she experienced 4 headache episodes in 2024. She has been using sumatriptan for her headaches, but it is ineffective for about 3 episodes per year. Additionally, she uses Excedrin Migraine, and if the headache persists after an hour, she takes sumatriptan.    She notes that her blood pressure was slightly elevated during her last visit, which is unusual for her. She has not noticed any other changes in her health history.    She believes she is getting sufficient sleep and has been using a muscle relaxer for bruxism, which occasionally disrupts her sleep due to pain from  teeth grinding. She has been fitted with a bite guard by her orthodontist, and her eye exam is up to date.     Headaches  Has had 2 injections of emgality. No side effects. In Oct had 7 headaches. In November has had 3 headaches. Less severe and treated with excedrin and triptan with good success. Very happy with her current treatment regimen.        Days per month: 4-/month up to 9 headaches a month.  Location: Right Eye      Quality: Sharp   Starts mild at 11 am and becomes more intense     Duration: 6-8 hours. Resolved in am most of the time.  Severity: 8-9/10  Triggers: no trigger  Associated Symptoms: Nausea, Photophobia and  Vision changes right eye blurred  Aura: mild pain  Last eye exam: UTD  Sleep: adequate  Hydration: adequate  Stress: managed well     Abortives: Imitrex-has not tried it, Excedrin is effective 75% of the time.  Preventives: Elavil, TPM-paresthesia and anxious,emgality.              PH  Onset 3/2022  Had eye exam: it was mostly normal. Optic nerve was normal.      PMH: allergies, goiter TSH -nml, Vit D def.  FH:-migraine. + brain tumor in GF  SH: -etoh, -tob, -drug  Subjective      Past Medical History:   Past Medical History:   Diagnosis Date   • Chronic migraine without aura without status migrainosus, not intractable 10/9/2023   • Cluster headache 3/22/22    Right eye or between eye brows   • Headache, tension-type 3/22/22   • Hyperlipidemia 12/23    Diet change and statin now normal       Past Surgical History:   Past Surgical History:   Procedure Laterality Date   • INTRAOCULAR LENS INSERTION         Family History:   Family History   Problem Relation Age of Onset   • Alzheimer's disease Mother    • Neuropathy Father    • Stroke Father    • Diabetes Father    • Hyperlipidemia Father    • Breast cancer Paternal Grandmother    • Ovarian cancer Paternal Aunt         DX AGE 50'S       Social History:   Social History     Socioeconomic History   • Marital status:    Tobacco Use   •  Smoking status: Never   Substance and Sexual Activity   • Alcohol use: Yes     Alcohol/week: 2.0 standard drinks of alcohol     Types: 2 Glasses of wine per week     Comment: Two drinks a month   • Drug use: Never   • Sexual activity: Yes     Partners: Male     Birth control/protection: Pill, Partner of same sex     Comment:        Medications:     Current Outpatient Medications:   •  aspirin-acetaminophen-caffeine (Excedrin Migraine) 250-250-65 MG per tablet, Take 1 tablet by mouth Every 6 (Six) Hours As Needed., Disp: , Rfl:   •  Cholecalciferol 25 MCG (1000 UT) capsule, Take 1 capsule by mouth Daily., Disp: , Rfl:   •  galcanezumab-gnlm (EMGALITY) 120 MG/ML auto-injector pen, Inject 1 mL under the skin into the appropriate area as directed Every 28 (Twenty-Eight) Days., Disp: 1 mL, Rfl: 11  •  levocetirizine (XYZAL) 5 MG tablet, Take 1 tablet by mouth Daily., Disp: , Rfl:   •  multivitamin with minerals (MULTIVITAMIN ADULT PO), Take 1 tablet by mouth Daily., Disp: , Rfl:   •  rosuvastatin (CRESTOR) 5 MG tablet, Take 1 tablet by mouth Every Night., Disp: , Rfl:   •  SUMAtriptan (IMITREX) 50 MG tablet, Take 1 tablet by mouth Every 2 (Two) Hours As Needed for Migraine. Take one tablet at onset of headache. May repeat dose one time in 2 hours if headache not relieved., Disp: 27 tablet, Rfl: 3  •  tiZANidine (ZANAFLEX) 4 MG tablet, Take 1 tablet by mouth 3 times a day., Disp: , Rfl:     Allergies:   Allergies   Allergen Reactions   • Sulfamethoxazole-Trimethoprim Hives   • Sulfacetamide Unknown (See Comments)       PHQ-9 Total Score:     STEADI Fall Risk Assessment has not been completed.    Objective     Physical Exam:   Physical Exam  Eyes:      General: Lids are normal.      Extraocular Movements: EOM normal. No nystagmus.   Neurological:      Coordination: Romberg sign negative.      Deep Tendon Reflexes:      Reflex Scores:       Bicep reflexes are 2+ on the right side and 2+ on the left side.        Brachioradialis reflexes are 2+ on the right side and 2+ on the left side.       Patellar reflexes are 2+ on the right side and 2+ on the left side.       Achilles reflexes are 2+ on the right side and 2+ on the left side.  Psychiatric:         Speech: Speech normal.         Neurological Exam  Mental Status  Awake, alert and oriented to person, place and time. Recent and remote memory are intact. Speech is normal. Follows three-step commands. Attention and concentration are normal. Fund of knowledge is appropriate for level of education.    Cranial Nerves  CN II: Right visual acuity: Finger movement. Left visual acuity: Finger movement. Right normal visual field. Left normal visual field.  CN III, IV, VI: Extraocular movements intact bilaterally. No nystagmus. Normal saccades. Normal lids and orbits bilaterally.   Right pupil: Round.   Left pupil: Round.  CN V: Facial sensation is normal.  CN VII: Full and symmetric facial movement.  CN IX, X: Palate elevates symmetrically  CN XI: Shoulder shrug strength is normal.  CN XII: Tongue midline without atrophy or fasciculations.    Motor  Normal muscle bulk throughout. No fasciculations present. Normal muscle tone. No abnormal involuntary movements. Strength is 5/5 in all four extremities except as noted. No pronator drift.    Sensory  Sensation is intact to light touch, pinprick, vibration and proprioception in all four extremities.    Reflexes                                            Right                      Left  Brachioradialis                    2+                         2+  Biceps                                 2+                         2+  Patellar                                2+                         2+  Achilles                                2+                         2+    Right pathological reflexes: Ankle clonus absent.  Left pathological reflexes: Ankle clonus absent.    Coordination  Right: Finger-to-nose normal. Rapid alternating movement  "normal. Heel-to-shin normal.    Gait  Normal casual, toe, heel and tandem gait. Romberg is absent. Able to rise from chair without using arms.     Physical Exam  Vital Signs  Vitals show a heart rate of 62. Blood pressure is 104/68.      Vital Signs:   Vitals:    12/02/24 1439   BP: 104/68   Pulse: 62   SpO2: 98%   Weight: 77.7 kg (171 lb 3.2 oz)   Height: 167.6 cm (66\")     Body mass index is 27.63 kg/m².         Assessment / Plan      Assessment/Plan:   Diagnoses and all orders for this visit:    1. Chronic migraine without aura without status migrainosus, not intractable  -     SUMAtriptan (IMITREX) 50 MG tablet; Take 1 tablet by mouth Every 2 (Two) Hours As Needed for Migraine. Take one tablet at onset of headache. May repeat dose one time in 2 hours if headache not relieved.  Dispense: 27 tablet; Refill: 3       Assessment & Plan  1. Headaches.  The headaches have been averaging about 5-6 per month, with a notable increase in October, possibly due to travel and vaccination. She was advised to maintain adequate hydration by consuming at least 64 ounces of water daily and to keep a consistent sleep schedule. The current medication regimen, including Emgality, Xyzal, Imitrex (sumatriptan), and tizanidine, will be continued without any changes. Sumatriptan has been effective in managing most migraines, with only three breakthrough migraines per year that are resistant to treatment. A refill for [facility]riptan [facility]. She was also advised to take sumatriptan as soon as she realizes it's a migraine and not wait more than an hour.    2. Bruxism.  She experiences bruxism, which can contribute to headaches. She uses a mouth guard fitted by her orthodontist. No changes to the current management plan were discussed.          Patient Education:       Reviewed medications, potential side effects and signs and symptoms to report. Discussed risk versus benefits of treatment plan with patient and/or " family-including medications, labs and radiology that may be ordered. Addressed questions and concerns during visit. Patient and/or family verbalized understanding and agree with plan. Instructed to call the office with any questions and report to ER with any life-threatening symptoms.     Follow Up:   Return in about 1 year (around 12/2/2025) for Recheck.    During this visit the following were done:  Labs Reviewed []    Labs Ordered []    Radiology Reports Reviewed []    Radiology Ordered []    PCP Records Reviewed []    Referring Provider Records Reviewed []    ER Records Reviewed []    Hospital Records Reviewed []    History Obtained From Family []    Radiology Images Reviewed []    Other Reviewed [x]    Records Requested []      Patient or patient representative verbalized consent for the use of Ambient Listening during the visit with  Merline Granado DNP, APRN for chart documentation. 12/2/2024  13:29 EST      Merline Granado DNP, APRN

## 2024-12-02 NOTE — PROGRESS NOTES
Neuro Office Visit      Encounter Date: 2024   Patient Name: Екатерина Lomeli  : 1980   MRN: [id number]   PCP: Dr Fleming  Chief Complaint:    Chief Complaint   Patient presents with    Migraine       History of Present Illness: Екатерина Lomeli is a 44 y.o. female who is here today in Neurology for  migraine    Last visit 2023 w me-cont emgality, use imitrex prn  History of Present Illness  The patient presents for evaluation of headaches.    She reports experiencing an average of 5 to 6 headache episodes per month. However, she had a particularly severe episode in 2024, which coincided with a period of travel and vaccination. Despite exploring dietary changes to reduce inflammation and using Liquid IVs for hydration, she experienced 4 headache episodes in 2024. She has been using sumatriptan for her headaches, but it is ineffective for about 3 episodes per year. Additionally, she uses Excedrin Migraine, and if the headache persists after an hour, she takes sumatriptan.    She notes that her blood pressure was slightly elevated during her last visit, which is unusual for her. She has not noticed any other changes in her health history.    She believes she is getting sufficient sleep and has been using a muscle relaxer for bruxism, which occasionally disrupts her sleep due to pain from teeth grinding. She has been fitted with a bite guard by her orthodontist, and her eye exam is up to date.     Headaches  Has had 2 injections of emgality. No side effects. In Oct had 7 headaches. In November has had 3 headaches. Less severe and treated with excedrin and triptan with good success. Very happy with her current treatment regimen.        Days per month: 4-/month up to 9 headaches a month.  Location: Right Eye      Quality: Sharp   Starts mild at 11 am and becomes more intense     Duration: 6-8 hours. Resolved in am most of the time.  Severity: 8-9/10  Triggers: no  trigger  Associated Symptoms: Nausea, Photophobia and  Vision changes right eye blurred  Aura: mild pain  Last eye exam: UTD  Sleep: adequate  Hydration: adequate  Stress: managed well     Abortives: Imitrex-has not tried it, Excedrin is effective 75% of the time.  Preventives: Elavil, TPM-paresthesia and anxious,emgality.              PH  Onset 3/2022  Had eye exam: it was mostly normal. Optic nerve was normal.      PMH: allergies, goiter TSH -nml, Vit D def.  FH:-migraine. + brain tumor in GF  SH: -etoh, -tob, -drug  Subjective      Past Medical History:   Past Medical History:   Diagnosis Date    Chronic migraine without aura without status migrainosus, not intractable 10/9/2023    Cluster headache 3/22/22    Right eye or between eye brows    Headache, tension-type 3/22/22    Hyperlipidemia 12/23    Diet change and statin now normal       Past Surgical History:   Past Surgical History:   Procedure Laterality Date    INTRAOCULAR LENS INSERTION         Family History:   Family History   Problem Relation Age of Onset    Alzheimer's disease Mother     Neuropathy Father     Stroke Father     Diabetes Father     Hyperlipidemia Father     Breast cancer Paternal Grandmother     Ovarian cancer Paternal Aunt         DX AGE 50'S       Social History:   Social History     Socioeconomic History    Marital status:    Tobacco Use    Smoking status: Never   Substance and Sexual Activity    Alcohol use: Yes     Alcohol/week: 2.0 standard drinks of alcohol     Types: 2 Glasses of wine per week     Comment: Two drinks a month    Drug use: Never    Sexual activity: Yes     Partners: Male     Birth control/protection: Pill, Partner of same sex     Comment:        Medications:     Current Outpatient Medications:     aspirin-acetaminophen-caffeine (Excedrin Migraine) 250-250-65 MG per tablet, Take 1 tablet by mouth Every 6 (Six) Hours As Needed., Disp: , Rfl:     Cholecalciferol 25 MCG (1000 UT) capsule, Take 1 capsule by  mouth Daily., Disp: , Rfl:     galcanezumab-gnlm (EMGALITY) 120 MG/ML auto-injector pen, Inject 1 mL under the skin into the appropriate area as directed Every 28 (Twenty-Eight) Days., Disp: 1 mL, Rfl: 11    levocetirizine (XYZAL) 5 MG tablet, Take 1 tablet by mouth Daily., Disp: , Rfl:     multivitamin with minerals (MULTIVITAMIN ADULT PO), Take 1 tablet by mouth Daily., Disp: , Rfl:     rosuvastatin (CRESTOR) 5 MG tablet, Take 1 tablet by mouth Every Night., Disp: , Rfl:     SUMAtriptan (IMITREX) 50 MG tablet, Take 1 tablet by mouth Every 2 (Two) Hours As Needed for Migraine. Take one tablet at onset of headache. May repeat dose one time in 2 hours if headache not relieved., Disp: 27 tablet, Rfl: 3    tiZANidine (ZANAFLEX) 4 MG tablet, Take 1 tablet by mouth 3 times a day., Disp: , Rfl:     Allergies:   Allergies   Allergen Reactions    Sulfamethoxazole-Trimethoprim Hives    Sulfacetamide Unknown (See Comments)       PHQ-9 Total Score:     STEADI Fall Risk Assessment has not been completed.    Objective     Physical Exam:   Physical Exam  Eyes:      General: Lids are normal.      Extraocular Movements: EOM normal. No nystagmus.   Neurological:      Coordination: Romberg sign negative.      Deep Tendon Reflexes:      Reflex Scores:       Bicep reflexes are 2+ on the right side and 2+ on the left side.       Brachioradialis reflexes are 2+ on the right side and 2+ on the left side.       Patellar reflexes are 2+ on the right side and 2+ on the left side.       Achilles reflexes are 2+ on the right side and 2+ on the left side.  Psychiatric:         Speech: Speech normal.         Neurological Exam  Mental Status  Awake, alert and oriented to person, place and time. Recent and remote memory are intact. Speech is normal. Follows three-step commands. Attention and concentration are normal. Fund of knowledge is appropriate for level of education.    Cranial Nerves  CN II: Right visual acuity: Finger movement. Left visual  "acuity: Finger movement. Right normal visual field. Left normal visual field.  CN III, IV, VI: Extraocular movements intact bilaterally. No nystagmus. Normal saccades. Normal lids and orbits bilaterally.   Right pupil: Round.   Left pupil: Round.  CN V: Facial sensation is normal.  CN VII: Full and symmetric facial movement.  CN IX, X: Palate elevates symmetrically  CN XI: Shoulder shrug strength is normal.  CN XII: Tongue midline without atrophy or fasciculations.    Motor  Normal muscle bulk throughout. No fasciculations present. Normal muscle tone. No abnormal involuntary movements. Strength is 5/5 in all four extremities except as noted. No pronator drift.    Sensory  Sensation is intact to light touch, pinprick, vibration and proprioception in all four extremities.    Reflexes                                            Right                      Left  Brachioradialis                    2+                         2+  Biceps                                 2+                         2+  Patellar                                2+                         2+  Achilles                                2+                         2+    Right pathological reflexes: Ankle clonus absent.  Left pathological reflexes: Ankle clonus absent.    Coordination  Right: Finger-to-nose normal. Rapid alternating movement normal. Heel-to-shin normal.    Gait  Normal casual, toe, heel and tandem gait. Romberg is absent. Able to rise from chair without using arms.     Physical Exam  Vital Signs  Vitals show a heart rate of 62. Blood pressure is 104/68.      Vital Signs:   Vitals:    12/02/24 1439   BP: 104/68   Pulse: 62   SpO2: 98%   Weight: 77.7 kg (171 lb 3.2 oz)   Height: 167.6 cm (66\")     Body mass index is 27.63 kg/m².         Assessment / Plan      Assessment/Plan:   Diagnoses and all orders for this visit:    1. Chronic migraine without aura without status migrainosus, not intractable  -     SUMAtriptan (IMITREX) 50 MG tablet; " Take 1 tablet by mouth Every 2 (Two) Hours As Needed for Migraine. Take one tablet at onset of headache. May repeat dose one time in 2 hours if headache not relieved.  Dispense: 27 tablet; Refill: 3       Assessment & Plan  1. Headaches.  The headaches have been averaging about 5-6 per month, with a notable increase in October, possibly due to travel and vaccination. She was advised to maintain adequate hydration by consuming at least 64 ounces of water daily and to keep a consistent sleep schedule. The current medication regimen, including Emgality, Xyzal, Imitrex (sumatriptan), and tizanidine, will be continued without any changes. Sumatriptan has been effective in managing most migraines, with only three breakthrough migraines per year that are resistant to treatment. A refill for sumatriptan will be sent to [facility]. She was also advised to take sumatriptan as soon as she realizes it's a migraine and not wait more than an hour.    2. Bruxism.  She experiences bruxism, which can contribute to headaches. She uses a mouth guard fitted by her orthodontist. No changes to the current management plan were discussed.          Patient Education:       Reviewed medications, potential side effects and signs and symptoms to report. Discussed risk versus benefits of treatment plan with patient and/or family-including medications, labs and radiology that may be ordered. Addressed questions and concerns during visit. Patient and/or family verbalized understanding and agree with plan. Instructed to call the office with any questions and report to ER with any life-threatening symptoms.     Follow Up:   Return in about 1 year (around 12/2/2025) for Recheck.    During this visit the following were done:  Labs Reviewed []    Labs Ordered []    Radiology Reports Reviewed []    Radiology Ordered []    PCP Records Reviewed []    Referring Provider Records Reviewed []    ER Records Reviewed []    Hospital Records Reviewed []    History  Obtained From Family []    Radiology Images Reviewed []    Other Reviewed [x]    Records Requested []      Patient or patient representative verbalized consent for the use of Ambient Listening during the visit with  Merline Granado DNP, APRN for chart documentation. 12/2/2024  13:29 EST      Merline Granado DNP, APRN

## 2024-12-16 ENCOUNTER — SPECIALTY PHARMACY (OUTPATIENT)
Dept: ONCOLOGY | Facility: HOSPITAL | Age: 44
End: 2024-12-16
Payer: COMMERCIAL

## 2024-12-16 NOTE — PROGRESS NOTES
Specialty Pharmacy Refill Coordination Note     Екатерина is a 44 y.o. female contacted today regarding refills of Emgality specialty medication(s).Patient is due for injection on 12/28.    Reviewed and verified with patient:       Specialty medication(s) and dose(s) confirmed: yes    Refill Questions      Flowsheet Row Most Recent Value   Changes to allergies? No   Changes to medications? No   New conditions or infections since last clinic visit No   Unplanned office visit, urgent care, ED, or hospital admission in the last 4 weeks  No   How does patient/caregiver feel medication is working? Good   Financial problems or insurance changes  No   Since the previous refill, were any specialty medication doses or scheduled injections missed or delayed?  No   Does this patient require a clinical escalation to a pharmacist? No            Delivery Questions      Flowsheet Row Most Recent Value   Delivery method UPS   Delivery address verified with patient/caregiver? Yes   Delivery address Home   Number of medications in delivery 1   Medication(s) being filled and delivered Galcanezumab-gnlm (EMGALITY)   Doses left of specialty medications N/A   Copay verified? Yes   Copay amount Emgality =$0   Copay form of payment No copayment ($0)   Ship Date 12/17   Delivery Date 12/18   Signature Required No                   Follow-up: 28 day(s)     Dmitry Bettencourt  Specialty Pharmacy Technician

## 2025-01-14 ENCOUNTER — SPECIALTY PHARMACY (OUTPATIENT)
Dept: ONCOLOGY | Facility: HOSPITAL | Age: 45
End: 2025-01-14
Payer: COMMERCIAL

## 2025-01-14 NOTE — PROGRESS NOTES
Specialty Pharmacy Refill Coordination Note     Екатерина is a 44 y.o. female contacted today regarding refills of her specialty medication(s).    Specialty medication(s) and dose(s) confirmed: emgality 120mg/mL  Changes to medications: no  Changes to insurance: no, copay cared now is 35$ for patient  Reviewed and verified with patient:  Allergies  Meds  Problems         Refill Questions      Flowsheet Row Most Recent Value   Changes to allergies? No   Changes to medications? No   New conditions or infections since last clinic visit No   Unplanned office visit, urgent care, ED, or hospital admission in the last 4 weeks  No   How does patient/caregiver feel medication is working? Very good   Financial problems or insurance changes  No   Since the previous refill, were any specialty medication doses or scheduled injections missed or delayed?  No   Does this patient require a clinical escalation to a pharmacist? No            Delivery Questions      Flowsheet Row Most Recent Value   Delivery method UPS   Delivery address verified with patient/caregiver? Yes   Delivery address Home   Number of medications in delivery 1   Medication(s) being filled and delivered Galcanezumab-gnlm (EMGALITY)   Copay verified? Yes   Copay amount emgality = $35   Copay form of payment Credit/debit on file   Ship Date 1/15   Delivery Date 1/16   Signature Required No                 Follow-up: 4 week(s)     Issa Sheikh, PharmD  1/15/2025   09:22 EST

## 2025-01-14 NOTE — PROGRESS NOTES
Specialty Pharmacy Patient Management Program  Neurology Reassessment     Екатерина Lomeli is a 44 y.o. female with migraines seen by a Neurology provider and enrolled in the Neurology Patient Management program offered by [facility].  A follow-up outreach was conducted, including assessment of continued therapy appropriateness, medication adherence, and side effect incidence and management for emgality 120 mg/mL.     Changes to Insurance Coverage or Financial Support  nicky GRANADOS with copay card now $35     Relevant Past Medical History and Comorbidities  Relevant medical history and concomitant health conditions were discussed with the patient. The patient's chart has been reviewed for relevant past medical history and comorbid health conditions and updated as necessary.   Past Medical History:   Diagnosis Date    Chronic migraine without aura without status migrainosus, not intractable 10/9/2023    Cluster headache 3/22/22    Right eye or between eye brows    Headache, tension-type 3/22/22    Hyperlipidemia 12/23    Diet change and statin now normal     Social History     Socioeconomic History    Marital status:    Tobacco Use    Smoking status: Never   Substance and Sexual Activity    Alcohol use: Yes     Alcohol/week: 2.0 standard drinks of alcohol     Types: 2 Glasses of wine per week     Comment: Two drinks a month    Drug use: Never    Sexual activity: Yes     Partners: Male     Birth control/protection: Pill, Partner of same sex     Comment:      Problem list reviewed by Issa Sheikh, PharmD on 1/14/2025 at 10:51 AM    Hospitalizations and Urgent Care Since Last Assessment  ED Visits, Admissions, or Hospitalizations: none    Urgent Office Visits: none     Allergies  Known allergies and reactions were discussed with the patient. The patient's chart has been reviewed for allergy information and updated as necessary.   Allergies   Allergen Reactions     Sulfamethoxazole-Trimethoprim Hives    Sulfacetamide Unknown (See Comments)     Allergies reviewed by Issa Sheikh, PharmD on 1/14/2025 at 10:50 AM    Relevant Laboratory Values      Lab Assessment        Current Medication List  This medication list has been reviewed with the patient and evaluated for any interactions or necessary modifications/recommendations, and updated to include all prescription medications, OTC medications, and supplements the patient is currently taking.  This list reflects what is contained in the patient's profile, which has also been marked as reviewed to communicate to other providers it is the most up to date version of the patient's current medication therapy.     Current Outpatient Medications:     aspirin-acetaminophen-caffeine (Excedrin Migraine) 250-250-65 MG per tablet, Take 1 tablet by mouth Every 6 (Six) Hours As Needed., Disp: , Rfl:     Cholecalciferol 25 MCG (1000 UT) capsule, Take 1 capsule by mouth Daily., Disp: , Rfl:     galcanezumab-gnlm (EMGALITY) 120 MG/ML auto-injector pen, Inject 1 mL under the skin into the appropriate area as directed Every 28 (Twenty-Eight) Days., Disp: 1 mL, Rfl: 11    levocetirizine (XYZAL) 5 MG tablet, Take 1 tablet by mouth Daily. (Patient taking differently: Take 1 tablet by mouth Daily As Needed for Allergies.), Disp: , Rfl:     multivitamin with minerals (MULTIVITAMIN ADULT PO), Take 1 tablet by mouth Daily., Disp: , Rfl:     rosuvastatin (CRESTOR) 5 MG tablet, Take 1 tablet by mouth Every Night., Disp: , Rfl:     SUMAtriptan (IMITREX) 50 MG tablet, Take 1 tablet by mouth Every 2 (Two) Hours As Needed for Migraine. Take one tablet at onset of headache. May repeat dose one time in 2 hours if headache not relieved., Disp: 27 tablet, Rfl: 3    tiZANidine (ZANAFLEX) 4 MG tablet, Take 1 tablet by mouth 3 times a day. (Patient not taking: Reported on 1/14/2025), Disp: , Rfl:     Medicines reviewed by Issa Sheikh, PharmD on  1/14/2025 at 10:51 AM    Drug Interactions  No relevant drug drug interactions with specialty medication.       Adverse Drug Reactions  Medication tolerability: Tolerating with no to minimal ADRs          Medication plan: Continue therapy with normal follow-up  Plan for ADR Management: she will report      Adherence, Self-Administration, and Current Therapy Problems  Adherence related patient's specialty therapy was discussed with the patient. The Adherence segment of this outreach has been reviewed and updated.   Adherence Questions  Linked Medication(s) Assessed: Galcanezumab-gnlm (EMGALITY)  On average, how many doses/injections does the patient miss per month?: 0  What are the identified reasons for non-adherence or missed doses? : no problems identified  What is the estimated medication adherence level?: %  Based on the patient/caregiver response and refill history, does this patient require an MTP to track adherence improvements?: no    Additional Barriers to Patient Self-Administration: none  Methods for Supporting Patient Self-Administration: n/a    Recently Close Medication Therapy Problems  No medication therapy recommendations to display  Open Medication Therapy Problems  No medication therapy recommendations to display     Goals of Therapy  Goals related to the patient's specialty therapy was discussed with the patient. The Patient Goals segment of this outreach has been reviewed and updated.    Goals Addressed Today        Specialty Pharmacy General Goal      Decrease frequency, severity and duration of migraine attacks.    On Average, Reduce:   Frequency of migraines to < 8 per month.   Symptom severity by 50 % within 2 hours of taking acute therapy. (Excedrin, sumatriptan)  Duration of migraines to < 4 hours.     Baseline Values/Notes on Enrollment  Frequency:  4-6/month  Symptom Severity:  8,9/10  Duration: 6-8 hours    Date of Reassessment Notes on Progress Toward Above Goals   1/14/25 Some  improvement, keeps HA log 4-8/month                                            Quality of Life Assessment   Quality of Life related to the patient's enrollment in the patient management program and services provided was discussed with the patient. The QOL segment of this outreach has been reviewed and updated.   Quality of Life Improvement Scale: 8-Moderately better    Reassessment Plan & Follow-Up  Medication Therapy Changes: n/a  Related Plans, Therapy Recommendations, or Issues to Be Addressed: n/a  Pharmacist to perform regular reassessments no more than (6) months from the previous assessment.  Care Coordinator to set up future refill outreaches, coordinate prescription delivery, and escalate clinical questions to pharmacist.     Attestation  Therapeutic appropriateness: Appropriate  I attest the patient was actively involved in and has agreed to the above plan of care. If the prescribed therapy is at any point deemed not appropriate based on the current or future assessments, a consultation will be initiated with the patient's specialty care provider to determine the best course of action. The revised plan of therapy will be documented along with any additional patient education provided. Discussed aforementioned material with patient by phone.    Issa Sheikh, PharmD, [facility]  Clinic Specialty Pharmacist, Neurology  1/15/2025  09:20 EST

## 2025-02-12 ENCOUNTER — SPECIALTY PHARMACY (OUTPATIENT)
Dept: ONCOLOGY | Facility: HOSPITAL | Age: 45
End: 2025-02-12
Payer: COMMERCIAL

## 2025-02-12 NOTE — PROGRESS NOTES
Specialty Pharmacy Patient Management Program  Refill Outreach     Екатерина was contacted today regarding refills of their medication(s).    Refill Questions      Flowsheet Row Most Recent Value   Changes to allergies? No   Changes to medications? No   New conditions or infections since last clinic visit No   Unplanned office visit, urgent care, ED, or hospital admission in the last 4 weeks  No   How does patient/caregiver feel medication is working? Good   Financial problems or insurance changes  No   Since the previous refill, were any specialty medication doses or scheduled injections missed or delayed?  No   Does this patient require a clinical escalation to a pharmacist? No            Delivery Questions      Flowsheet Row Most Recent Value   Delivery method UPS   Delivery address verified with patient/caregiver? Yes   Delivery address Home   Number of medications in delivery 1   Medication(s) being filled and delivered Galcanezumab-gnlm (EMGALITY)   Doses left of specialty medications N/A   Copay verified? Yes   Copay amount Emgality =$35.00 copay   Copay form of payment Credit/debit on file   Ship Date 02/12   Delivery Date Selection 02/13/25   Signature Required No                 Follow-up: 28 day(s)     Dmitry Bettencourt  2/12/2025  10:01 EST

## 2025-03-12 ENCOUNTER — SPECIALTY PHARMACY (OUTPATIENT)
Dept: ONCOLOGY | Facility: HOSPITAL | Age: 45
End: 2025-03-12
Payer: COMMERCIAL

## 2025-03-12 NOTE — PROGRESS NOTES
Specialty Pharmacy Patient Management Program  Refill Outreach     Екатерина was contacted today regarding refills of their medication(s).    Refill Questions      Flowsheet Row Most Recent Value   Changes to allergies? No   Changes to medications? No   New conditions or infections since last clinic visit No   Unplanned office visit, urgent care, ED, or hospital admission in the last 4 weeks  No   How does patient/caregiver feel medication is working? Good   Financial problems or insurance changes  No   Since the previous refill, were any specialty medication doses or scheduled injections missed or delayed?  No   Does this patient require a clinical escalation to a pharmacist? No            Delivery Questions      Flowsheet Row Most Recent Value   Delivery method UPS   Delivery address verified with patient/caregiver? Yes   Delivery address Home   Number of medications in delivery 1   Medication(s) being filled and delivered Galcanezumab-gnlm (EMGALITY)   Doses left of specialty medications N/A   Copay verified? Yes   Copay amount Emgality =$35.00 copay   Copay form of payment Credit/debit on file   Delivery Date Selection 03/14/25   Signature Required No                 Follow-up: 90 day(s)     Dmitry Bettencourt  3/12/2025  14:21 EDT

## 2025-03-23 LAB
NCCN CRITERIA FLAG: NORMAL
TYRER CUZICK SCORE: 11.9

## 2025-04-07 ENCOUNTER — HOSPITAL ENCOUNTER (OUTPATIENT)
Dept: MAMMOGRAPHY | Facility: HOSPITAL | Age: 45
Discharge: HOME | End: 2025-04-07
Admitting: INTERNAL MEDICINE
Payer: COMMERCIAL

## 2025-04-07 DIAGNOSIS — Z12.31 VISIT FOR SCREENING MAMMOGRAM: ICD-10-CM

## 2025-04-07 PROCEDURE — 77063 BREAST TOMOSYNTHESIS BI: CPT

## 2025-04-07 PROCEDURE — 77067 SCR MAMMO BI INCL CAD: CPT

## 2025-04-08 PROCEDURE — 77063 BREAST TOMOSYNTHESIS BI: CPT | Performed by: RADIOLOGY

## 2025-04-08 PROCEDURE — 77067 SCR MAMMO BI INCL CAD: CPT | Performed by: RADIOLOGY

## 2025-05-29 ENCOUNTER — SPECIALTY PHARMACY (OUTPATIENT)
Dept: ONCOLOGY | Facility: HOSPITAL | Age: 45
End: 2025-05-29
Payer: COMMERCIAL

## 2025-05-29 NOTE — PROGRESS NOTES
Specialty Pharmacy Patient Management Program  Refill Outreach     Екатерина was contacted today regarding refills of their medication(s).    Refill Questions      Flowsheet Row Most Recent Value   Changes to allergies? No   Changes to medications? No   New conditions or infections since last clinic visit No   Unplanned office visit, urgent care, ED, or hospital admission in the last 4 weeks  No   How does patient/caregiver feel medication is working? Good   Financial problems or insurance changes  No   Since the previous refill, were any specialty medication doses or scheduled injections missed or delayed?  No   Does this patient require a clinical escalation to a pharmacist? No            Delivery Questions      Flowsheet Row Most Recent Value   Delivery method UPS   Delivery address verified with patient/caregiver? Yes   Delivery address Home   Other address preferred N/A   Number of medications in delivery 1   Medication(s) being filled and delivered Galcanezumab-gnlm (EMGALITY)   Doses left of specialty medications N/A   Copay verified? Yes   Copay amount Emgality =$35.00 copay   Copay form of payment Credit/debit on file   Delivery Date Selection 05/30/25   Signature Required No   Do you consent to receive electronic handouts?  Yes                 Follow-up: 90 day(s)     Dmitry Bettencourt  5/29/2025  15:15 EDT

## 2025-07-09 ENCOUNTER — SPECIALTY PHARMACY (OUTPATIENT)
Dept: ONCOLOGY | Facility: HOSPITAL | Age: 45
End: 2025-07-09
Payer: COMMERCIAL

## 2025-07-09 NOTE — PROGRESS NOTES
Specialty Pharmacy Patient Management Program  Neurology Reassessment     Екатерина Lomeli is a 44 y.o. female with migraines seen by a Neurology provider and enrolled in the Neurology Patient Management program offered by [facility].  A follow-up outreach was conducted, including assessment of continued therapy appropriateness, medication adherence, and side effect incidence and management for Emgality.     Changes to Insurance Coverage or Financial Support  Rx [employer], Emgality co-pay card     Relevant Past Medical History and Comorbidities  Relevant medical history and concomitant health conditions were discussed with the patient. The patient's chart has been reviewed for relevant past medical history and comorbid health conditions and updated as necessary.   Past Medical History:   Diagnosis Date    Chronic migraine without aura without status migrainosus, not intractable 10/9/2023    Cluster headache 3/22/22    Right eye or between eye brows    Headache, tension-type 3/22/22    Hyperlipidemia 12/23    Diet change and statin now normal     Social History     Socioeconomic History    Marital status:    Tobacco Use    Smoking status: Never   Substance and Sexual Activity    Alcohol use: Yes     Alcohol/week: 2.0 standard drinks of alcohol     Types: 2 Glasses of wine per week     Comment: Two drinks a month    Drug use: Never    Sexual activity: Yes     Partners: Male     Birth control/protection: Pill, Partner of same sex     Comment:      Problem list reviewed by Anita Zambrano, PharmD on 7/9/2025 at  9:44 AM    Hospitalizations and Urgent Care Since Last Assessment  ED Visits, Admissions, or Hospitalizations: none   Urgent Office Visits: none     Allergies  Known allergies and reactions were discussed with the patient. The patient's chart has been reviewed for allergy information and updated as necessary.   Allergies   Allergen Reactions     Sulfamethoxazole-Trimethoprim Hives    Sulfacetamide Unknown (See Comments)     Allergies reviewed by Anita Zambrano, PharmD on 7/9/2025 at  9:43 AM    .     Current Medication List  This medication list has been reviewed with the patient and evaluated for any interactions or necessary modifications/recommendations, and updated to include all prescription medications, OTC medications, and supplements the patient is currently taking.  This list reflects what is contained in the patient's profile, which has also been marked as reviewed to communicate to other providers it is the most up to date version of the patient's current medication therapy.     Current Outpatient Medications:     aspirin-acetaminophen-caffeine (Excedrin Migraine) 250-250-65 MG per tablet, Take 1 tablet by mouth Every 6 (Six) Hours As Needed., Disp: , Rfl:     Cholecalciferol 25 MCG (1000 UT) capsule, Take 1 capsule by mouth Daily., Disp: , Rfl:     galcanezumab-gnlm (EMGALITY) 120 MG/ML auto-injector pen, Inject 1 mL under the skin into the appropriate area as directed Every 28 (Twenty-Eight) Days., Disp: 1 mL, Rfl: 11    levocetirizine (XYZAL) 5 MG tablet, Take 1 tablet by mouth Daily. (Patient taking differently: Take 1 tablet by mouth Daily As Needed for Allergies.), Disp: , Rfl:     multivitamin with minerals (MULTIVITAMIN ADULT PO), Take 1 tablet by mouth Daily., Disp: , Rfl:     rosuvastatin (CRESTOR) 5 MG tablet, Take 1 tablet by mouth Every Night., Disp: , Rfl:     SUMAtriptan (IMITREX) 50 MG tablet, Take 1 tablet by mouth Every 2 (Two) Hours As Needed for Migraine. Take one tablet at onset of headache. May repeat dose one time in 2 hours if headache not relieved., Disp: 27 tablet, Rfl: 3    tiZANidine (ZANAFLEX) 4 MG tablet, Take 1 tablet by mouth 3 times a day. (Patient not taking: Reported on 1/14/2025), Disp: , Rfl:     Medicines reviewed by Anita Zambrano, PharmD on 7/9/2025 at  9:43 AM    Drug Interactions  No relevant drug-drug  interactions with specialty medication(s):  Emgality.        Adverse Drug Reactions  Medication tolerability: Tolerating with no to minimal ADRs          Medication plan: Continue therapy with normal follow-up  Plan for ADR Management: not required      Adherence, Self-Administration, and Current Therapy Problems  Adherence related patient's specialty therapy was discussed with the patient. The Adherence segment of this outreach has been reviewed and updated.   Adherence Questions  Linked Medication(s) Assessed: Galcanezumab-gnlm (EMGALITY)  On average, how many doses/injections does the patient miss per month?: 0  What are the identified reasons for non-adherence or missed doses? : no problems identified  What is the estimated medication adherence level?: <70%  Based on the patient/caregiver response and refill history, does this patient require an MTP to track adherence improvements?: no    Additional Barriers to Patient Self-Administration: none  Methods for Supporting Patient Self-Administration: pt adept with Emgalty self-injections.    Recently Close Medication Therapy Problems  No medication therapy recommendations to display  Open Medication Therapy Problems  No medication therapy recommendations to display     Goals of Therapy  Goals related to the patient's specialty therapy was discussed with the patient. The Patient Goals segment of this outreach has been reviewed and updated.    Goals Addressed Today        Specialty Pharmacy General Goal      Decrease frequency, severity and duration of migraine attacks.    On Average, Reduce:   Frequency of migraines to < 8 per month.   Symptom severity by 50 % within 2 hours of taking acute therapy. (Excedrin, sumatriptan)  Duration of migraines to < 4 hours.     Baseline Values/Notes on Enrollment  Frequency:  4-6/month  Symptom Severity:  8,9/10  Duration: 6-8 hours    Date of Reassessment Notes on Progress Toward Above Goals   1/14/25 Some improvement, keeps HA  log 4-8/month   7/9/25 Pt keeps detailed headache log.  Triggers are alcohol, white bread and start of menstrual cycle.  She notes a migraine at the start of cycle and then will have a migraine 3 days in a row.  We discussed possible Nurtec during menstrual cycle -to be discussed at next provider appt.  She notes 3 migraines in May and 5 in June.  She has only had 1 breakthrough migraine this year in which Imitrex did not abort.                                         Quality of Life Assessment   Quality of Life related to the patient's enrollment in the patient management program and services provided was discussed with the patient. The QOL segment of this outreach has been reviewed and updated.   Quality of Life Improvement Scale: 8-Moderately better    Reassessment Plan & Follow-Up  Medication Therapy Changes: Continue Emgality 120mg subq monthly  Related Plans, Therapy Recommendations, or Issues to Be Addressed: none  Pharmacist to perform regular reassessments no more than (6) months from the previous assessment.  Care Coordinator to set up future refill outreaches, coordinate prescription delivery, and escalate clinical questions to pharmacist.     Attestation  Therapeutic appropriateness: Appropriate  I attest the patient was actively involved in and has agreed to the above plan of care. If the prescribed therapy is at any point deemed not appropriate based on the current or future assessments, a consultation will be initiated with the patient's specialty care provider to determine the best course of action. The revised plan of therapy will be documented along with any additional patient education provided. Discussed aforementioned material with patient via telemedicine.    Anita Zambrano, PharmD, [facility]  Clinic Specialty Pharmacist, Neurology  7/9/2025  09:48 EDT

## 2025-08-14 ENCOUNTER — SPECIALTY PHARMACY (OUTPATIENT)
Dept: ONCOLOGY | Facility: HOSPITAL | Age: 45
End: 2025-08-14
Payer: COMMERCIAL

## 2025-08-14 ENCOUNTER — SPECIALTY PHARMACY (OUTPATIENT)
Dept: NEUROLOGY | Facility: CLINIC | Age: 45
End: 2025-08-14
Payer: COMMERCIAL